# Patient Record
Sex: MALE | Race: WHITE | NOT HISPANIC OR LATINO | Employment: OTHER | URBAN - NONMETROPOLITAN AREA
[De-identification: names, ages, dates, MRNs, and addresses within clinical notes are randomized per-mention and may not be internally consistent; named-entity substitution may affect disease eponyms.]

---

## 2017-10-03 ENCOUNTER — OFFICE VISIT (OUTPATIENT)
Dept: INTERNAL MEDICINE | Facility: CLINIC | Age: 57
End: 2017-10-03

## 2017-10-03 VITALS
WEIGHT: 173 LBS | HEIGHT: 72 IN | SYSTOLIC BLOOD PRESSURE: 122 MMHG | OXYGEN SATURATION: 97 % | BODY MASS INDEX: 23.43 KG/M2 | TEMPERATURE: 98.1 F | DIASTOLIC BLOOD PRESSURE: 74 MMHG | RESPIRATION RATE: 14 BRPM | HEART RATE: 88 BPM

## 2017-10-03 DIAGNOSIS — L21.9 SEBORRHEIC DERMATITIS: ICD-10-CM

## 2017-10-03 DIAGNOSIS — H61.23 BILATERAL IMPACTED CERUMEN: ICD-10-CM

## 2017-10-03 DIAGNOSIS — Z00.00 HEALTHCARE MAINTENANCE: ICD-10-CM

## 2017-10-03 DIAGNOSIS — Z12.11 COLON CANCER SCREENING: ICD-10-CM

## 2017-10-03 DIAGNOSIS — R21 RASH, SKIN: ICD-10-CM

## 2017-10-03 DIAGNOSIS — M54.50 LOW BACK PAIN WITHOUT SCIATICA, UNSPECIFIED BACK PAIN LATERALITY, UNSPECIFIED CHRONICITY: Primary | ICD-10-CM

## 2017-10-03 PROCEDURE — 90686 IIV4 VACC NO PRSV 0.5 ML IM: CPT | Performed by: INTERNAL MEDICINE

## 2017-10-03 PROCEDURE — 82270 OCCULT BLOOD FECES: CPT | Performed by: INTERNAL MEDICINE

## 2017-10-03 PROCEDURE — 69209 REMOVE IMPACTED EAR WAX UNI: CPT | Performed by: INTERNAL MEDICINE

## 2017-10-03 PROCEDURE — 90471 IMMUNIZATION ADMIN: CPT | Performed by: INTERNAL MEDICINE

## 2017-10-03 PROCEDURE — 99396 PREV VISIT EST AGE 40-64: CPT | Performed by: INTERNAL MEDICINE

## 2017-10-03 NOTE — PROGRESS NOTES
Subjective   El Greenwood is a 57 y.o. male and is here for a comprehensive physical exam.     Do you take any herbs or supplements that were not prescribed by a doctor? no  Are you taking calcium supplements? no  Are you taking aspirin daily? no      The following portions of the patient's history were reviewed and updated as appropriate: allergies, current medications, past family history, past medical history, past social history, past surgical history and problem list.      Review of Systems   Constitutional: Negative.    HENT: Negative.    Eyes: Negative.    Respiratory: Negative.    Cardiovascular: Negative.    Gastrointestinal: Negative.    Endocrine: Negative.    Genitourinary: Negative.    Musculoskeletal: Negative.    Skin: Positive for rash.   Allergic/Immunologic: Negative.    Neurological: Negative.    Hematological: Negative.    Psychiatric/Behavioral: Negative.    All other systems reviewed and are negative.        Physical Exam   Constitutional: He is oriented to person, place, and time. He appears well-developed and well-nourished.   HENT:   Head: Normocephalic and atraumatic.   Right Ear: External ear normal.   Left Ear: External ear normal.   Nose: Nose normal.   Mouth/Throat: Oropharynx is clear and moist.   Cerumen impaction bilaterally   Eyes: Conjunctivae and EOM are normal. Pupils are equal, round, and reactive to light.   Neck: Normal range of motion. Neck supple. No thyromegaly present.   Cardiovascular: Normal rate, regular rhythm, normal heart sounds and intact distal pulses.    Pulmonary/Chest: Effort normal and breath sounds normal.   Abdominal: Soft. Bowel sounds are normal.   Genitourinary: Rectum normal, prostate normal and penis normal.   Musculoskeletal: Normal range of motion.   Neurological: He is alert and oriented to person, place, and time. He has normal reflexes.   Skin: Skin is warm and dry. Rash noted.   Psychiatric: He has a normal mood and affect. His behavior is  normal. Judgment and thought content normal.   Nursing note and vitals reviewed.      All  tests have been reviewed.    Assessment/Plan          1. Patient Counseling:  --Nutrition: Stressed importance of moderation in sodium/caffeine intake, saturated fat and cholesterol, caloric balance, sufficient intake of fresh fruits, vegetables, fiber, calcium and iron.  --Exercise: Stressed the importance of regular exercise.   --Injury prevention: Discussed safety belts, safety helmets, smoke detector, smoking near bedding or upholstery.   --Dental health: Discussed importance of regular tooth brushing, flossing, and dental visits.  --Immunizations reviewed.  --Discussed benefits of screening colonoscopy.  --After hours service discussed with patient    2. Discussed the patient's BMI with him.            facial SD continue ointment  LBP XR mild DJD  hematuria, history of work up for decades.obtain records, patient to get.   Floater follow up with eye doc  vitD low , continue vitD3 2000u daily  solar lentigo, watch   tdap 12/2011  vasectomy history  colon 2009 normal. patient wants to repeat in 2019  Cerumen impaction failed to manual.  Irrigation successful  Do lab now  Physical in a year     Ear Cerumen Removal Instrumentation  Date/Time: 10/3/2017 4:59 PM  Performed by: ROCK RIVERO  Authorized by: ROCK RIVERO   Consent: Verbal consent obtained.  Risks and benefits: risks, benefits and alternatives were discussed  Consent given by: patient  Patient understanding: patient states understanding of the procedure being performed  Patient consent: the patient's understanding of the procedure matches consent given  Procedure consent: procedure consent matches procedure scheduled  Patient identity confirmed: verbally with patient    Anesthesia:  Local Anesthetic: none  Location details: right ear and left ear  Procedure type: irrigation    Sedation:  Patient sedated: no  Patient tolerance: Patient tolerated the procedure well  with no immediate complications

## 2017-10-05 DIAGNOSIS — E87.5 POTASSIUM EXCESS: Primary | ICD-10-CM

## 2017-10-06 LAB
25(OH)D3+25(OH)D2 SERPL-MCNC: 30.1 NG/ML
ALBUMIN SERPL-MCNC: 4.5 G/DL (ref 3.5–5)
ALBUMIN/GLOB SERPL: 1.6 G/DL (ref 1–2)
ALP SERPL-CCNC: 79 U/L (ref 38–126)
ALT SERPL-CCNC: 32 U/L (ref 13–69)
APPEARANCE UR: CLEAR
AST SERPL-CCNC: 25 U/L (ref 15–46)
BACTERIA #/AREA URNS HPF: NORMAL /HPF
BASOPHILS # BLD AUTO: 0.05 10*3/MM3 (ref 0–0.2)
BASOPHILS NFR BLD AUTO: 0.9 % (ref 0–2.5)
BILIRUB SERPL-MCNC: 0.7 MG/DL (ref 0.2–1.3)
BILIRUB UR QL STRIP: NEGATIVE
BUN SERPL-MCNC: 19 MG/DL (ref 7–20)
BUN/CREAT SERPL: 17.3 (ref 6.3–21.9)
CALCIUM SERPL-MCNC: 10 MG/DL (ref 8.4–10.2)
CHLORIDE SERPL-SCNC: 102 MMOL/L (ref 98–107)
CHOLEST SERPL-MCNC: 189 MG/DL (ref 0–199)
CO2 SERPL-SCNC: 31 MMOL/L (ref 26–30)
COLOR UR: YELLOW
CREAT SERPL-MCNC: 1.1 MG/DL (ref 0.6–1.3)
EOSINOPHIL # BLD AUTO: 0.22 10*3/MM3 (ref 0–0.7)
EOSINOPHIL NFR BLD AUTO: 3.9 % (ref 0–7)
EPI CELLS #/AREA URNS HPF: NORMAL /HPF
ERYTHROCYTE [DISTWIDTH] IN BLOOD BY AUTOMATED COUNT: 12.6 % (ref 11.5–14.5)
GLOBULIN SER CALC-MCNC: 2.8 GM/DL
GLUCOSE SERPL-MCNC: 98 MG/DL (ref 74–98)
GLUCOSE UR QL: NEGATIVE
HCT VFR BLD AUTO: 46.7 % (ref 42–52)
HCV AB S/CO SERPL IA: 0.1 S/CO RATIO (ref 0–0.9)
HDLC SERPL-MCNC: 44 MG/DL (ref 40–60)
HGB BLD-MCNC: 15.1 G/DL (ref 14–18)
HGB UR QL STRIP: ABNORMAL
IMM GRANULOCYTES # BLD: 0.03 10*3/MM3 (ref 0–0.06)
IMM GRANULOCYTES NFR BLD: 0.5 % (ref 0–0.6)
KETONES UR QL STRIP: NEGATIVE
LDLC SERPL CALC-MCNC: 123 MG/DL (ref 0–99)
LEUKOCYTE ESTERASE UR QL STRIP: NEGATIVE
LYMPHOCYTES # BLD AUTO: 1.82 10*3/MM3 (ref 0.6–3.4)
LYMPHOCYTES NFR BLD AUTO: 32.5 % (ref 10–50)
MCH RBC QN AUTO: 29.5 PG (ref 27–31)
MCHC RBC AUTO-ENTMCNC: 32.3 G/DL (ref 30–37)
MCV RBC AUTO: 91.2 FL (ref 80–94)
MONOCYTES # BLD AUTO: 0.57 10*3/MM3 (ref 0–0.9)
MONOCYTES NFR BLD AUTO: 10.2 % (ref 0–12)
NEUTROPHILS # BLD AUTO: 2.91 10*3/MM3 (ref 2–6.9)
NEUTROPHILS NFR BLD AUTO: 52 % (ref 37–80)
NITRITE UR QL STRIP: NEGATIVE
NRBC BLD AUTO-RTO: 0 /100 WBC (ref 0–0)
PH UR STRIP: 6.5 [PH] (ref 5–8)
PLATELET # BLD AUTO: 199 10*3/MM3 (ref 130–400)
POTASSIUM SERPL-SCNC: 5.3 MMOL/L (ref 3.5–5.1)
PROT SERPL-MCNC: 7.3 G/DL (ref 6.3–8.2)
PROT UR QL STRIP: NEGATIVE
PSA SERPL-MCNC: 0.58 NG/ML (ref 0.06–4)
RBC # BLD AUTO: 5.12 10*6/MM3 (ref 4.7–6.1)
RBC #/AREA URNS HPF: NORMAL /HPF
SODIUM SERPL-SCNC: 144 MMOL/L (ref 137–145)
SP GR UR: 1.02 (ref 1–1.03)
TRIGL SERPL-MCNC: 108 MG/DL
TSH SERPL DL<=0.005 MIU/L-ACNC: 1.6 MIU/ML (ref 0.47–4.68)
UROBILINOGEN UR STRIP-MCNC: ABNORMAL MG/DL
VLDLC SERPL CALC-MCNC: 21.6 MG/DL
WBC # BLD AUTO: 5.6 10*3/MM3 (ref 4.8–10.8)
WBC #/AREA URNS HPF: NORMAL /HPF

## 2017-10-10 ENCOUNTER — TELEPHONE (OUTPATIENT)
Dept: INTERNAL MEDICINE | Facility: CLINIC | Age: 57
End: 2017-10-10

## 2017-10-10 ENCOUNTER — RESULTS ENCOUNTER (OUTPATIENT)
Dept: INTERNAL MEDICINE | Facility: CLINIC | Age: 57
End: 2017-10-10

## 2017-10-10 DIAGNOSIS — E87.5 POTASSIUM EXCESS: ICD-10-CM

## 2017-10-10 NOTE — TELEPHONE ENCOUNTER
Talk with patient patient will only pay $82 for the blood tests.  Patient is okay without change in diagnosis.

## 2017-10-11 LAB — HEMOCCULT STL QL IA: NEGATIVE

## 2018-11-01 ENCOUNTER — OFFICE VISIT (OUTPATIENT)
Dept: INTERNAL MEDICINE | Facility: CLINIC | Age: 58
End: 2018-11-01

## 2018-11-01 VITALS
DIASTOLIC BLOOD PRESSURE: 78 MMHG | SYSTOLIC BLOOD PRESSURE: 118 MMHG | WEIGHT: 172 LBS | OXYGEN SATURATION: 99 % | HEART RATE: 74 BPM | HEIGHT: 72 IN | BODY MASS INDEX: 23.3 KG/M2

## 2018-11-01 DIAGNOSIS — L21.9 SEBORRHEIC DERMATITIS: ICD-10-CM

## 2018-11-01 DIAGNOSIS — Z00.00 HEALTHCARE MAINTENANCE: ICD-10-CM

## 2018-11-01 DIAGNOSIS — M54.50 LOW BACK PAIN WITHOUT SCIATICA, UNSPECIFIED BACK PAIN LATERALITY, UNSPECIFIED CHRONICITY: Primary | ICD-10-CM

## 2018-11-01 PROBLEM — H61.23 BILATERAL IMPACTED CERUMEN: Status: RESOLVED | Noted: 2017-10-03 | Resolved: 2018-11-01

## 2018-11-01 PROCEDURE — 99396 PREV VISIT EST AGE 40-64: CPT | Performed by: INTERNAL MEDICINE

## 2018-11-01 PROCEDURE — 90674 CCIIV4 VAC NO PRSV 0.5 ML IM: CPT | Performed by: INTERNAL MEDICINE

## 2018-11-01 PROCEDURE — 90471 IMMUNIZATION ADMIN: CPT | Performed by: INTERNAL MEDICINE

## 2018-11-01 NOTE — PROGRESS NOTES
Subjective   El Greenwood is a 58 y.o. male and is here for a comprehensive physical exam.     Do you take any herbs or supplements that were not prescribed by a doctor? no  Are you taking calcium supplements? no  Are you taking aspirin daily? no      The following portions of the patient's history were reviewed and updated as appropriate: allergies, current medications, past family history, past medical history, past social history, past surgical history and problem list.      Review of Systems   Constitutional: Negative.    HENT: Negative.    Eyes: Negative.    Respiratory: Negative.    Cardiovascular: Negative.    Gastrointestinal: Negative.    Endocrine: Negative.    Genitourinary: Negative.    Musculoskeletal: Negative.    Skin: Negative.    Allergic/Immunologic: Negative.    Neurological: Negative.    Hematological: Negative.    Psychiatric/Behavioral: Negative.    All other systems reviewed and are negative.        Physical Exam   Constitutional: He is oriented to person, place, and time. He appears well-developed and well-nourished.   HENT:   Head: Normocephalic and atraumatic.   Right Ear: External ear normal.   Left Ear: External ear normal.   Nose: Nose normal.   Mouth/Throat: Oropharynx is clear and moist.   Eyes: Pupils are equal, round, and reactive to light. Conjunctivae and EOM are normal.   Neck: Normal range of motion. Neck supple. No thyromegaly present.   Cardiovascular: Normal rate, regular rhythm, normal heart sounds and intact distal pulses.    Pulmonary/Chest: Effort normal and breath sounds normal.   Abdominal: Soft. Bowel sounds are normal.   Genitourinary: Rectum normal, prostate normal and penis normal.   Musculoskeletal: Normal range of motion.   Neurological: He is alert and oriented to person, place, and time. He has normal reflexes.   Skin: Skin is warm and dry.   Psychiatric: He has a normal mood and affect. His behavior is normal. Judgment and thought content normal.   Nursing  note and vitals reviewed.      All  tests have been reviewed.    Assessment/Plan          1. Patient Counseling:  --Nutrition: Stressed importance of moderation in sodium/caffeine intake, saturated fat and cholesterol, caloric balance, sufficient intake of fresh fruits, vegetables, fiber, calcium and iron.  --Exercise: Stressed the importance of regular exercise.   --Injury prevention: Discussed safety belts, safety helmets, smoke detector, smoking near bedding or upholstery.   --Dental health: Discussed importance of regular tooth brushing, flossing, and dental visits.  --Immunizations reviewed.  --Discussed benefits of screening colonoscopy.  --After hours service discussed with patient    2. Discussed the patient's BMI with him.             facial SD continue ointment  LBP XR mild DJD  hematuria, history of work up for decades.obtain records, patient to get.   Floater follow up with eye doc   vitD low , continue vitD3 2000u daily  solar lentigo, watch   tdap 12/2011, flushot today, hep A to think  vasectomy history  colon 2009 normal. patient wants to repeat in 2019    Do lab now  Physical in a year

## 2018-11-05 ENCOUNTER — RESULTS ENCOUNTER (OUTPATIENT)
Dept: INTERNAL MEDICINE | Facility: CLINIC | Age: 58
End: 2018-11-05

## 2018-11-05 DIAGNOSIS — R31.9 HEMATURIA, UNSPECIFIED TYPE: Primary | ICD-10-CM

## 2018-11-05 DIAGNOSIS — R31.9 HEMATURIA, UNSPECIFIED TYPE: ICD-10-CM

## 2018-11-05 LAB
25(OH)D3+25(OH)D2 SERPL-MCNC: 33 NG/ML
ALBUMIN SERPL-MCNC: 4.4 G/DL (ref 3.5–5)
ALBUMIN/GLOB SERPL: 1.6 G/DL (ref 1–2)
ALP SERPL-CCNC: 78 U/L (ref 38–126)
ALT SERPL-CCNC: 32 U/L (ref 13–69)
APPEARANCE UR: CLEAR
AST SERPL-CCNC: 31 U/L (ref 15–46)
BACTERIA #/AREA URNS HPF: ABNORMAL /HPF
BASOPHILS # BLD AUTO: 0.04 10*3/MM3 (ref 0–0.2)
BASOPHILS NFR BLD AUTO: 0.7 % (ref 0–2.5)
BILIRUB SERPL-MCNC: 0.7 MG/DL (ref 0.2–1.3)
BILIRUB UR QL STRIP: NEGATIVE
BUN SERPL-MCNC: 19 MG/DL (ref 7–20)
BUN/CREAT SERPL: 17.3 (ref 6.3–21.9)
CALCIUM SERPL-MCNC: 9.7 MG/DL (ref 8.4–10.2)
CASTS URNS MICRO: ABNORMAL
CHLORIDE SERPL-SCNC: 102 MMOL/L (ref 98–107)
CHOLEST SERPL-MCNC: 195 MG/DL (ref 0–199)
CO2 SERPL-SCNC: 30 MMOL/L (ref 26–30)
COLOR UR: YELLOW
CREAT SERPL-MCNC: 1.1 MG/DL (ref 0.6–1.3)
EOSINOPHIL # BLD AUTO: 0.17 10*3/MM3 (ref 0–0.7)
EOSINOPHIL NFR BLD AUTO: 2.8 % (ref 0–7)
EPI CELLS #/AREA URNS HPF: ABNORMAL /HPF
ERYTHROCYTE [DISTWIDTH] IN BLOOD BY AUTOMATED COUNT: 13 % (ref 11.5–14.5)
GLOBULIN SER CALC-MCNC: 2.8 GM/DL
GLUCOSE SERPL-MCNC: 99 MG/DL (ref 74–98)
GLUCOSE UR QL: NEGATIVE
HCT VFR BLD AUTO: 46.4 % (ref 42–52)
HDLC SERPL-MCNC: 54 MG/DL (ref 40–60)
HGB BLD-MCNC: 15 G/DL (ref 14–18)
HGB UR QL STRIP: ABNORMAL
IMM GRANULOCYTES # BLD: 0.01 10*3/MM3 (ref 0–0.06)
IMM GRANULOCYTES NFR BLD: 0.2 % (ref 0–0.6)
KETONES UR QL STRIP: NEGATIVE
LDLC SERPL CALC-MCNC: 123 MG/DL (ref 0–99)
LEUKOCYTE ESTERASE UR QL STRIP: NEGATIVE
LYMPHOCYTES # BLD AUTO: 1.79 10*3/MM3 (ref 0.6–3.4)
LYMPHOCYTES NFR BLD AUTO: 29.7 % (ref 10–50)
MCH RBC QN AUTO: 29.8 PG (ref 27–31)
MCHC RBC AUTO-ENTMCNC: 32.3 G/DL (ref 30–37)
MCV RBC AUTO: 92.2 FL (ref 80–94)
MONOCYTES # BLD AUTO: 0.71 10*3/MM3 (ref 0–0.9)
MONOCYTES NFR BLD AUTO: 11.8 % (ref 0–12)
MUCOUS THREADS URNS QL MICRO: ABNORMAL /HPF
NEUTROPHILS # BLD AUTO: 3.31 10*3/MM3 (ref 2–6.9)
NEUTROPHILS NFR BLD AUTO: 54.8 % (ref 37–80)
NITRITE UR QL STRIP: NEGATIVE
NRBC BLD AUTO-RTO: 0 /100 WBC (ref 0–0)
PH UR STRIP: 7 [PH] (ref 5–8)
PLATELET # BLD AUTO: 209 10*3/MM3 (ref 130–400)
POTASSIUM SERPL-SCNC: 4.5 MMOL/L (ref 3.5–5.1)
PROT SERPL-MCNC: 7.2 G/DL (ref 6.3–8.2)
PROT UR QL STRIP: NEGATIVE
PSA SERPL-MCNC: 0.51 NG/ML (ref 0.06–4)
RBC # BLD AUTO: 5.03 10*6/MM3 (ref 4.7–6.1)
RBC #/AREA URNS HPF: ABNORMAL /HPF
SODIUM SERPL-SCNC: 138 MMOL/L (ref 137–145)
SP GR UR: 1.01 (ref 1–1.03)
TRIGL SERPL-MCNC: 92 MG/DL
TSH SERPL DL<=0.005 MIU/L-ACNC: 1.33 MIU/ML (ref 0.47–4.68)
UROBILINOGEN UR STRIP-MCNC: ABNORMAL MG/DL
VLDLC SERPL CALC-MCNC: 18.4 MG/DL
WBC # BLD AUTO: 6.03 10*3/MM3 (ref 4.8–10.8)
WBC #/AREA URNS HPF: ABNORMAL /HPF

## 2019-12-04 ENCOUNTER — OFFICE VISIT (OUTPATIENT)
Dept: INTERNAL MEDICINE | Facility: CLINIC | Age: 59
End: 2019-12-04

## 2019-12-04 VITALS
TEMPERATURE: 97.4 F | HEIGHT: 72 IN | HEART RATE: 82 BPM | DIASTOLIC BLOOD PRESSURE: 72 MMHG | BODY MASS INDEX: 24.62 KG/M2 | OXYGEN SATURATION: 96 % | WEIGHT: 181.8 LBS | RESPIRATION RATE: 18 BRPM | SYSTOLIC BLOOD PRESSURE: 126 MMHG

## 2019-12-04 DIAGNOSIS — Z12.11 COLON CANCER SCREENING: ICD-10-CM

## 2019-12-04 DIAGNOSIS — Z00.00 HEALTHCARE MAINTENANCE: ICD-10-CM

## 2019-12-04 DIAGNOSIS — M54.50 LOW BACK PAIN WITHOUT SCIATICA, UNSPECIFIED BACK PAIN LATERALITY, UNSPECIFIED CHRONICITY: Primary | ICD-10-CM

## 2019-12-04 PROCEDURE — 90472 IMMUNIZATION ADMIN EACH ADD: CPT | Performed by: INTERNAL MEDICINE

## 2019-12-04 PROCEDURE — 90471 IMMUNIZATION ADMIN: CPT | Performed by: INTERNAL MEDICINE

## 2019-12-04 PROCEDURE — 90632 HEPA VACCINE ADULT IM: CPT | Performed by: INTERNAL MEDICINE

## 2019-12-04 PROCEDURE — 90674 CCIIV4 VAC NO PRSV 0.5 ML IM: CPT | Performed by: INTERNAL MEDICINE

## 2019-12-04 PROCEDURE — 99396 PREV VISIT EST AGE 40-64: CPT | Performed by: INTERNAL MEDICINE

## 2019-12-04 NOTE — PROGRESS NOTES
Subjective   El Greenwood is a 59 y.o. male and is here for a comprehensive physical exam.     Do you take any herbs or supplements that were not prescribed by a doctor? no  Are you taking calcium supplements? no  Are you taking aspirin daily? no      The following portions of the patient's history were reviewed and updated as appropriate: allergies, current medications, past family history, past medical history, past social history, past surgical history and problem list.      Review of Systems   Constitutional: Negative.    HENT: Negative.    Eyes: Negative.    Respiratory: Negative.    Cardiovascular: Negative.    Gastrointestinal: Negative.    Endocrine: Negative.    Genitourinary: Negative.    Musculoskeletal: Negative.    Skin: Negative.    Allergic/Immunologic: Negative.    Neurological: Negative.    Hematological: Negative.    Psychiatric/Behavioral: Negative.    All other systems reviewed and are negative.        Physical Exam   Constitutional: He is oriented to person, place, and time. He appears well-developed and well-nourished.   HENT:   Head: Normocephalic and atraumatic.   Right Ear: External ear normal.   Left Ear: External ear normal.   Nose: Nose normal.   Mouth/Throat: Oropharynx is clear and moist.   Eyes: Conjunctivae and EOM are normal. Pupils are equal, round, and reactive to light.   Neck: Normal range of motion. Neck supple. No thyromegaly present.   Cardiovascular: Normal rate, regular rhythm, normal heart sounds and intact distal pulses.   Pulmonary/Chest: Effort normal and breath sounds normal.   Abdominal: Soft. Bowel sounds are normal.   Genitourinary: Rectum normal, prostate normal and penis normal.   Musculoskeletal: Normal range of motion.   Neurological: He is alert and oriented to person, place, and time. He has normal reflexes.   Skin: Skin is warm and dry.   Psychiatric: He has a normal mood and affect. His behavior is normal. Judgment and thought content normal.   Nursing  note and vitals reviewed.      All  tests have been reviewed.    Assessment/Plan          1. Patient Counseling:  --Nutrition: Stressed importance of moderation in sodium/caffeine intake, saturated fat and cholesterol, caloric balance, sufficient intake of fresh fruits, vegetables, fiber, calcium and iron.  --Exercise: Stressed the importance of regular exercise.   --Injury prevention: Discussed safety belts, safety helmets, smoke detector, smoking near bedding or upholstery.   --Dental health: Discussed importance of regular tooth brushing, flossing, and dental visits.  --Immunizations reviewed.  --Discussed benefits of screening colonoscopy.  --After hours service discussed with patient    2. Discussed the patient's BMI with him.             facial SD continue ointment  LBP XR mild DJD  hematuria, history of work up for decades.obtain records, patient to get.   Floater follow up with eye doc   vitD low , continue vitD3 2000u daily  solar lentigo, watch   tdap 12/2011, flushot done, hep A to think  vasectomy history  colon 2009 normal. patient wants to repeat in 2019     Do lab now

## 2019-12-06 LAB
25(OH)D3+25(OH)D2 SERPL-MCNC: 24.9 NG/ML (ref 30–100)
ALBUMIN SERPL-MCNC: 4.3 G/DL (ref 3.5–5.2)
ALBUMIN/GLOB SERPL: 1.7 G/DL
ALP SERPL-CCNC: 76 U/L (ref 39–117)
ALT SERPL-CCNC: 19 U/L (ref 1–41)
AST SERPL-CCNC: 22 U/L (ref 1–40)
BASOPHILS # BLD AUTO: 0.04 10*3/MM3 (ref 0–0.2)
BASOPHILS NFR BLD AUTO: 0.7 % (ref 0–1.5)
BILIRUB SERPL-MCNC: 0.5 MG/DL (ref 0.2–1.2)
BUN SERPL-MCNC: 20 MG/DL (ref 8–23)
BUN/CREAT SERPL: 18.9 (ref 7–25)
CALCIUM SERPL-MCNC: 9.3 MG/DL (ref 8.6–10.5)
CHLORIDE SERPL-SCNC: 104 MMOL/L (ref 98–107)
CHOLEST SERPL-MCNC: 185 MG/DL (ref 0–200)
CO2 SERPL-SCNC: 29.4 MMOL/L (ref 22–29)
CREAT SERPL-MCNC: 1.06 MG/DL (ref 0.76–1.27)
EOSINOPHIL # BLD AUTO: 0.18 10*3/MM3 (ref 0–0.4)
EOSINOPHIL NFR BLD AUTO: 3 % (ref 0.3–6.2)
ERYTHROCYTE [DISTWIDTH] IN BLOOD BY AUTOMATED COUNT: 12.6 % (ref 12.3–15.4)
GLOBULIN SER CALC-MCNC: 2.5 GM/DL
GLUCOSE SERPL-MCNC: 95 MG/DL (ref 65–99)
HCT VFR BLD AUTO: 42.9 % (ref 37.5–51)
HDLC SERPL-MCNC: 46 MG/DL (ref 40–60)
HGB BLD-MCNC: 14.3 G/DL (ref 13–17.7)
IMM GRANULOCYTES # BLD AUTO: 0.02 10*3/MM3 (ref 0–0.05)
IMM GRANULOCYTES NFR BLD AUTO: 0.3 % (ref 0–0.5)
LDLC SERPL CALC-MCNC: 119 MG/DL (ref 0–100)
LYMPHOCYTES # BLD AUTO: 2.05 10*3/MM3 (ref 0.7–3.1)
LYMPHOCYTES NFR BLD AUTO: 34.6 % (ref 19.6–45.3)
MCH RBC QN AUTO: 30.2 PG (ref 26.6–33)
MCHC RBC AUTO-ENTMCNC: 33.3 G/DL (ref 31.5–35.7)
MCV RBC AUTO: 90.5 FL (ref 79–97)
MONOCYTES # BLD AUTO: 0.65 10*3/MM3 (ref 0.1–0.9)
MONOCYTES NFR BLD AUTO: 11 % (ref 5–12)
NEUTROPHILS # BLD AUTO: 2.99 10*3/MM3 (ref 1.7–7)
NEUTROPHILS NFR BLD AUTO: 50.4 % (ref 42.7–76)
NRBC BLD AUTO-RTO: 0 /100 WBC (ref 0–0.2)
PLATELET # BLD AUTO: 203 10*3/MM3 (ref 140–450)
POTASSIUM SERPL-SCNC: 4.4 MMOL/L (ref 3.5–5.2)
PROT SERPL-MCNC: 6.8 G/DL (ref 6–8.5)
PSA SERPL-MCNC: 0.83 NG/ML (ref 0–4)
RBC # BLD AUTO: 4.74 10*6/MM3 (ref 4.14–5.8)
SODIUM SERPL-SCNC: 143 MMOL/L (ref 136–145)
TRIGL SERPL-MCNC: 101 MG/DL (ref 0–150)
TSH SERPL DL<=0.005 MIU/L-ACNC: 1.78 UIU/ML (ref 0.27–4.2)
VLDLC SERPL CALC-MCNC: 20.2 MG/DL
WBC # BLD AUTO: 5.93 10*3/MM3 (ref 3.4–10.8)

## 2019-12-11 ENCOUNTER — PREP FOR SURGERY (OUTPATIENT)
Dept: OTHER | Facility: HOSPITAL | Age: 59
End: 2019-12-11

## 2019-12-11 DIAGNOSIS — Z12.11 COLON CANCER SCREENING: Primary | ICD-10-CM

## 2019-12-11 RX ORDER — SODIUM CHLORIDE 9 MG/ML
70 INJECTION, SOLUTION INTRAVENOUS CONTINUOUS PRN
Status: CANCELLED | OUTPATIENT
Start: 2019-12-11

## 2019-12-30 PROBLEM — Z12.11 COLON CANCER SCREENING: Status: ACTIVE | Noted: 2019-12-30

## 2020-02-10 ENCOUNTER — TELEPHONE (OUTPATIENT)
Dept: GASTROENTEROLOGY | Facility: CLINIC | Age: 60
End: 2020-02-10

## 2020-02-10 NOTE — TELEPHONE ENCOUNTER
----- Message from Teri Ovalles sent at 2/10/2020  1:13 PM EST -----  Contact: [phone]  The patient left a  requesting that someone call him.  He states he is scheduled for a colonoscopy and has questions about what he can and cannot eat.

## 2020-02-12 ENCOUNTER — ANESTHESIA EVENT (OUTPATIENT)
Dept: GASTROENTEROLOGY | Facility: HOSPITAL | Age: 60
End: 2020-02-12

## 2020-02-12 ENCOUNTER — ANESTHESIA (OUTPATIENT)
Dept: GASTROENTEROLOGY | Facility: HOSPITAL | Age: 60
End: 2020-02-12

## 2020-02-12 ENCOUNTER — HOSPITAL ENCOUNTER (OUTPATIENT)
Facility: HOSPITAL | Age: 60
Setting detail: HOSPITAL OUTPATIENT SURGERY
Discharge: HOME | End: 2020-02-12
Attending: INTERNAL MEDICINE | Admitting: INTERNAL MEDICINE

## 2020-02-12 VITALS
HEIGHT: 72 IN | SYSTOLIC BLOOD PRESSURE: 115 MMHG | OXYGEN SATURATION: 97 % | WEIGHT: 175.25 LBS | RESPIRATION RATE: 16 BRPM | BODY MASS INDEX: 23.74 KG/M2 | DIASTOLIC BLOOD PRESSURE: 73 MMHG | HEART RATE: 68 BPM | TEMPERATURE: 97.4 F

## 2020-02-12 DIAGNOSIS — Z12.11 COLON CANCER SCREENING: ICD-10-CM

## 2020-02-12 PROCEDURE — 45380 COLONOSCOPY AND BIOPSY: CPT | Performed by: INTERNAL MEDICINE

## 2020-02-12 PROCEDURE — S0260 H&P FOR SURGERY: HCPCS | Performed by: INTERNAL MEDICINE

## 2020-02-12 PROCEDURE — 25010000002 PROPOFOL 1000 MG/100ML EMULSION: Performed by: NURSE ANESTHETIST, CERTIFIED REGISTERED

## 2020-02-12 PROCEDURE — 25010000002 FENTANYL CITRATE (PF) 100 MCG/2ML SOLUTION: Performed by: NURSE ANESTHETIST, CERTIFIED REGISTERED

## 2020-02-12 PROCEDURE — 45385 COLONOSCOPY W/LESION REMOVAL: CPT | Performed by: INTERNAL MEDICINE

## 2020-02-12 RX ORDER — SODIUM CHLORIDE 9 MG/ML
70 INJECTION, SOLUTION INTRAVENOUS CONTINUOUS PRN
Status: DISCONTINUED | OUTPATIENT
Start: 2020-02-12 | End: 2020-02-12 | Stop reason: HOSPADM

## 2020-02-12 RX ORDER — SIMETHICONE 20 MG/.3ML
EMULSION ORAL AS NEEDED
Status: DISCONTINUED | OUTPATIENT
Start: 2020-02-12 | End: 2020-02-12 | Stop reason: HOSPADM

## 2020-02-12 RX ORDER — LIDOCAINE HYDROCHLORIDE 20 MG/ML
INJECTION, SOLUTION INTRAVENOUS AS NEEDED
Status: DISCONTINUED | OUTPATIENT
Start: 2020-02-12 | End: 2020-02-12 | Stop reason: SURG

## 2020-02-12 RX ORDER — FENTANYL CITRATE 50 UG/ML
INJECTION, SOLUTION INTRAMUSCULAR; INTRAVENOUS AS NEEDED
Status: DISCONTINUED | OUTPATIENT
Start: 2020-02-12 | End: 2020-02-12 | Stop reason: SURG

## 2020-02-12 RX ORDER — PROPOFOL 10 MG/ML
INJECTION, EMULSION INTRAVENOUS AS NEEDED
Status: DISCONTINUED | OUTPATIENT
Start: 2020-02-12 | End: 2020-02-12 | Stop reason: SURG

## 2020-02-12 RX ORDER — LIDOCAINE 50 MG/G
OINTMENT TOPICAL AS NEEDED
Status: DISCONTINUED | OUTPATIENT
Start: 2020-02-12 | End: 2020-02-12 | Stop reason: HOSPADM

## 2020-02-12 RX ORDER — MAGNESIUM HYDROXIDE 1200 MG/15ML
LIQUID ORAL AS NEEDED
Status: DISCONTINUED | OUTPATIENT
Start: 2020-02-12 | End: 2020-02-12 | Stop reason: HOSPADM

## 2020-02-12 RX ADMIN — FENTANYL CITRATE 100 MCG: 50 INJECTION, SOLUTION INTRAMUSCULAR; INTRAVENOUS at 08:57

## 2020-02-12 RX ADMIN — PROPOFOL 50 MG: 10 INJECTION, EMULSION INTRAVENOUS at 08:57

## 2020-02-12 RX ADMIN — PROPOFOL 50 MG: 10 INJECTION, EMULSION INTRAVENOUS at 09:31

## 2020-02-12 RX ADMIN — LIDOCAINE HYDROCHLORIDE 100 MG: 20 INJECTION, SOLUTION INTRAVENOUS at 08:57

## 2020-02-12 RX ADMIN — PROPOFOL 50 MG: 10 INJECTION, EMULSION INTRAVENOUS at 09:23

## 2020-02-12 RX ADMIN — PROPOFOL 50 MG: 10 INJECTION, EMULSION INTRAVENOUS at 09:06

## 2020-02-12 RX ADMIN — PROPOFOL 50 MG: 10 INJECTION, EMULSION INTRAVENOUS at 09:16

## 2020-02-12 RX ADMIN — SODIUM CHLORIDE 70 ML/HR: 9 INJECTION, SOLUTION INTRAVENOUS at 08:22

## 2020-02-12 NOTE — OP NOTE
PROCEDURE:  Colonoscopy to the terminal ileum with 1 cold snare and 4 cold biopsy polypectomies.    DATE OF PROCEDURE:  February 13, 2020    REFERRING PROVIDER:  Jose Ibrahim MD     INSTRUMENT USED:  Olympus PCF H 190 DL videocolonoscope.     INDICATIONS OF THE PROCEDURE:   This is a 60-year-old white male for colon cancer screening.  (Open Colonoscopy).    PREVIOUS COLONOSCOPY: 2009.    BIOPSIES: Cecum: 1 cold snare polypectomy.  Ascending colon: 1 cold biopsy polypectomy.  Descending colon: 1 cold biopsy polypectomy.  Rectum: 2 cold biopsy polypectomies.      PHOTOGRAPHS:  Photographs were included in the medical records.     MEDICATIONS:  MAC.       CONSENT/PREPROCEDURE EVALUATION:  Risks, benefits, alternatives and options of the procedure including risks of sedation/anesthesia were discussed with the patient and informed consent was obtained prior to the procedure.  History and physical examination were performed and nothing precluded the test.      REPORT:  The patient was placed in left lateral decubitus position and a digital examination was performed.  Once under the influence of IV sedation, the instrument was inserted into the rectum and advanced under direct vision to cecum which was identified by the ileocecal valve, triradiate folds and appendiceal orifice. The scope was then maneuvered into the terminal ileum.        FINDINGS:      Digital rectal examination:  Good anal tone.  No perianal pathology.  No mass.        Terminal ileum:  7-8 cm.  Normal.     Cecum and ascending colon: A 5 mm sessile polyp in the cecum was removed with cold snare.  3 mm sessile polyp in the ascending colon was removed with cold biopsy forceps.     Hepatic flexure, transverse colon, splenic flexure:  Normal.         Descending colon, sigmoid colon and rectum: Diverticulosis.  1,  3 mm sessile polyp in the descending colon and 2 in the rectum were removed with cold  biopsie forceps.  A retroflex examination within the  rectum revealed internal hemorrhoids.        The scope was then straightened, the lower GI tract was decompressed, and the scope was pulled out of the patient.  The patient tolerated the procedure well.  There were no immediate complications and the patient was transferred in stable condition for post procedure observation.      TECHNICAL DATA:   1. Murphys prep score: 7 (3+2+2).    2. Anus to cecal time: 7  minutes.  3. Difficulty of examination:  Average.  The colon was noted to be tortuous, redundant and spastic.  Multiple loops were reduced.  4. Withdrawal time: 20 minutes.  5. Procedure time: 43 minutes  6. Retroflex examination in right colon: Yes.    7. Second look Rectum to cecum with decompression: Yes.    DIAGNOSES:    1. Left-sided diverticulosis.  2. Colon polyps.  5 were removed.  3-5 mm in size.  3. Internal hemorrhoids.  The colon was noted to be tortuous, redundant and spastic.  Multiple loops were reduced.  Prep score 7.    RECOMMENDATIONS:     1. Dietary instructions.  2. Follow biopsies.    3. Follow-up:    The patient may call back in 1 week regarding biopsy results.  However, if desired to follow-up in person, the patient may follow-up in 3 to 4 weeks in the office.  4. Followup colonoscopy: Preferably in 3 years in view of significantly tortuous, redundant and spastic colon requiring reduction of multiple loops and relative low prep score of 7.       Thank you very much for letting me participate in the care of this patient. Please do not hesitate to call me if you have any questions.

## 2020-02-12 NOTE — ANESTHESIA PREPROCEDURE EVALUATION
Anesthesia Evaluation     Patient summary reviewed and Nursing notes reviewed   NPO Solid Status: > 8 hours  NPO Liquid Status: > 8 hours           Airway   Mallampati: II  TM distance: >3 FB  Neck ROM: full  No difficulty expected  Dental      Pulmonary - normal exam   Cardiovascular - normal exam        Neuro/Psych  GI/Hepatic/Renal/Endo      Musculoskeletal     Abdominal  - normal exam   Substance History      OB/GYN          Other                        Anesthesia Plan    ASA 2     MAC     intravenous induction       Plan discussed with CRNA.

## 2020-02-12 NOTE — ANESTHESIA POSTPROCEDURE EVALUATION
Patient: El Greenwood    Procedure Summary     Date:  02/12/20 Room / Location:  [facility] ENDOSCOPY 2 / [facility] ENDOSCOPY    Anesthesia Start:  0859 Anesthesia Stop:  0955    Procedure:  COLONOSCOPY (N/A Anus) Diagnosis:       Diverticulosis      Colon polyps      Internal hemorrhoid      (Colon cancer screening [Z12.11])    Surgeon:  Pavel Girard MD Provider:  Marlon Bacon CRNA    Anesthesia Type:  MAC ASA Status:  2          Anesthesia Type: MAC    Vitals  Vitals Value Taken Time   /73 2/12/2020  9:56 AM   Temp 97.4 °F (36.3 °C) 2/12/2020  9:56 AM   Pulse 71 2/12/2020  9:56 AM   Resp 16 2/12/2020  9:56 AM   SpO2 97 % 2/12/2020  9:56 AM           Post Anesthesia Care and Evaluation    Patient location during evaluation: bedside  Patient participation: complete - patient participated  Level of consciousness: awake  Pain score: 0  Pain management: adequate  Airway patency: patent  Anesthetic complications: No anesthetic complications  PONV Status: controlled  Cardiovascular status: acceptable and stable  Respiratory status: acceptable and room air  Hydration status: acceptable

## 2020-02-12 NOTE — DISCHARGE INSTRUCTIONS
To assist you in voiding:  Drink plenty of fluids  Listen to running water while attempting to void.    If you are unable to urinate and you have an uncomfortable urge to void or it has been   6 hours since you were discharged, return to the Emergency Room.    Rest today  No pushing,pulling,tugging,heavy lifting, or strenuous activity   No major decision making,driving,or drinking alcoholic beverages for 24 hours due to the sedation you received  Always use good hand hygiene/washing technique  No driving on pain medication.    ************************************************************************************    Postprocedure instructions:    1. Nothing by mouth to fully alert.  2. Once fully alert may have clear liquid diet.  3. Advance diet as tolerated.  4. Vital signs as routine.    Diet:     1. Low-fat diet.      Blood Thinner Directions:    Avoid Aspirin & other NSAIDS for _7__ days. Tylenol is okay.    Treatments:      Other Instructions:    Call [facility] at [phone] or come to the Emergency Department if you experience the following: Chest pain, abdominal pain, bleeding (vomiting of blood or coffee colored material, black stools or jeremias blood in stools), fever/chills, nausea and vomiting or dizziness.      Follow-up:  The patient may call back in 1 week regarding biopsy results.  However, if desired to follow-up in person, the patient may follow-up in 3 to 4 weeks in the office.      Follow-up colonoscopy: Preferably in 3 years.  ************************************************************************************    Notes to the patient and the family from Dr. Girard.    Dear patient/family member,    Today your colon was examined extensively from rectum to cecum and beyond into the small intestine twice.   Findings on today's procedure are as follows:    1. Left-sided diverticulosis. These are benign outpouchings in the colon.   2. Colon polyps. 5 were found and removed.       3. No  cancer. No inflammation or colitis. No suggestion of Crohn's disease.      4. Internal hemorrhoids.      5. Your colon was noted to be significantly tortuous, redundant and spastic.      Recommendations:    1. As above.        Should you have more questions please do not hesitate to talk to the nurse who can call me and let me talk to you.      I hope you feel better.    Pavel Girard M.D., FACP, FACG.

## 2020-02-12 NOTE — H&P
"Chief complaint:  Colon CA Screen (Open)    History of present illness: Colon Cancer Screen, Last Colonoscopy 2009      Past medical history:   History reviewed. No pertinent past medical history.    Surgical history:    Past Surgical History:   Procedure Laterality Date   • COLONOSCOPY  2009   • TONSILLECTOMY     • VASECTOMY         Social history:  Social History     Socioeconomic History   • Marital status:      Spouse name: Not on file   • Number of children: Not on file   • Years of education: Not on file   • Highest education level: Not on file   Tobacco Use   • Smoking status: Former Smoker   • Smokeless tobacco: Never Used   Substance and Sexual Activity   • Alcohol use: Yes     Alcohol/week: 7.0 standard drinks     Types: 2 Cans of beer, 5 Standard drinks or equivalent per week     Comment: 3-7 per week   • Drug use: No   • Sexual activity: Defer       Allergies:  Patient has no known allergies.  Latex allergy: None  Contrast allergy: None    Medications:  Medications Prior to Admission   Medication Sig Dispense Refill Last Dose   • Multiple Vitamins-Minerals (MULTIVITAMIN PO) Take  by mouth.   2/10/2020       Review of systems:   Constitutional: No recent: Fever, Weight loss  Respiratory: No recent: SOB, Cough  Cardiovascular: No recent: Chest Pains, congestive heart failure or arrhythmias.   Neurological: No recent: Seizures, CVA, TIA.   Genitourinary: No recent: Renal Failure, UTI.  Endocrine: No recent: Worsening of diabetes or thyroid disease.  Musculoskeletal:   No recent: Joint swelling.  Hem. Oncology: No recent: Anemia or bleeding.  Psychiatric: No recent: Worsening of depression or anxiety.     VITAL SIGNS:    Blood pressure 154/93, pulse 113, temperature 98.6 °F (37 °C), temperature source Temporal, resp. rate 16, height 182.9 cm (72\"), weight 79.5 kg (175 lb 4 oz), SpO2 96 %.    PHYSICAL EXAMINATION:   HEENT: Normal.   Lungs: Clear to auscultation.  Heart: No S3, no " murmur.    Abdomen: Soft.  BS+ ND, NT  Extremities: No edema.  No cyanosis.  Neuro: Alert X 3. No focal deficit.    Assessment: Colon Cancer Screen, Last Colonoscopy 2009      Plan:  Colonoscopy (Open)     Risks/Benefits:  The potential benefits, risk and/or side effects of the procedure and alternatives have been discussed with the patient/authorized representative and questions were answered.

## 2020-02-17 LAB
LAB AP CASE REPORT: NORMAL
PATH REPORT.FINAL DX SPEC: NORMAL

## 2020-10-20 ENCOUNTER — FLU SHOT (OUTPATIENT)
Dept: INTERNAL MEDICINE | Facility: CLINIC | Age: 60
End: 2020-10-20

## 2020-10-20 DIAGNOSIS — Z23 NEED FOR INFLUENZA VACCINATION: ICD-10-CM

## 2020-10-20 PROCEDURE — 90471 IMMUNIZATION ADMIN: CPT | Performed by: INTERNAL MEDICINE

## 2020-10-20 PROCEDURE — 90686 IIV4 VACC NO PRSV 0.5 ML IM: CPT | Performed by: INTERNAL MEDICINE

## 2020-12-07 ENCOUNTER — OFFICE VISIT (OUTPATIENT)
Dept: INTERNAL MEDICINE | Facility: CLINIC | Age: 60
End: 2020-12-07

## 2020-12-07 VITALS
WEIGHT: 179 LBS | OXYGEN SATURATION: 96 % | TEMPERATURE: 97.7 F | SYSTOLIC BLOOD PRESSURE: 114 MMHG | HEART RATE: 94 BPM | HEIGHT: 72 IN | DIASTOLIC BLOOD PRESSURE: 74 MMHG | BODY MASS INDEX: 24.24 KG/M2

## 2020-12-07 DIAGNOSIS — Z00.00 HEALTHCARE MAINTENANCE: Primary | ICD-10-CM

## 2020-12-07 DIAGNOSIS — L21.9 SEBORRHEIC DERMATITIS: ICD-10-CM

## 2020-12-07 DIAGNOSIS — M54.50 LOW BACK PAIN WITHOUT SCIATICA, UNSPECIFIED BACK PAIN LATERALITY, UNSPECIFIED CHRONICITY: ICD-10-CM

## 2020-12-07 DIAGNOSIS — Z12.11 COLON CANCER SCREENING: ICD-10-CM

## 2020-12-07 PROCEDURE — 99396 PREV VISIT EST AGE 40-64: CPT | Performed by: INTERNAL MEDICINE

## 2020-12-07 PROCEDURE — 90471 IMMUNIZATION ADMIN: CPT | Performed by: INTERNAL MEDICINE

## 2020-12-07 PROCEDURE — 90715 TDAP VACCINE 7 YRS/> IM: CPT | Performed by: INTERNAL MEDICINE

## 2020-12-07 NOTE — PROGRESS NOTES
Subjective   El Greenwood is a 60 y.o. male and is here for a comprehensive physical exam.     Do you take any herbs or supplements that were not prescribed by a doctor? no  Are you taking calcium supplements? no  Are you taking aspirin daily? no      The following portions of the patient's history were reviewed and updated as appropriate: allergies, current medications, past family history, past medical history, past social history, past surgical history and problem list.      Review of Systems   Constitutional: Negative.    HENT: Negative.    Eyes: Negative.    Respiratory: Negative.    Cardiovascular: Negative.    Gastrointestinal: Negative.    Endocrine: Negative.    Genitourinary: Negative.    Musculoskeletal: Negative.    Skin: Negative.    Allergic/Immunologic: Negative.    Neurological: Negative.    Hematological: Negative.    Psychiatric/Behavioral: Negative.    All other systems reviewed and are negative.        Physical Exam  Constitutional:       Appearance: He is well-developed.   HENT:      Head: Normocephalic.      Nose: Nose normal.      Mouth/Throat:      Mouth: Mucous membranes are moist.      Pharynx: Oropharynx is clear.   Eyes:      Conjunctiva/sclera: Conjunctivae normal.      Pupils: Pupils are equal, round, and reactive to light.   Neck:      Musculoskeletal: Normal range of motion and neck supple.   Cardiovascular:      Rate and Rhythm: Normal rate and regular rhythm.      Heart sounds: Normal heart sounds.   Pulmonary:      Effort: Pulmonary effort is normal.      Breath sounds: Normal breath sounds.   Abdominal:      General: Bowel sounds are normal.      Palpations: Abdomen is soft.   Skin:     General: Skin is warm.   Neurological:      Mental Status: He is alert and oriented to person, place, and time.   Psychiatric:         Behavior: Behavior normal.         All  tests have been reviewed.    Assessment/Plan          1. Patient Counseling:  --Nutrition: Stressed importance of  moderation in sodium/caffeine intake, saturated fat and cholesterol, caloric balance, sufficient intake of fresh fruits, vegetables, fiber, calcium and iron.  --Exercise: Stressed the importance of regular exercise.   --Injury prevention: Discussed safety belts, safety helmets, smoke detector, smoking near bedding or upholstery.   --Dental health: Discussed importance of regular tooth brushing, flossing, and dental visits.  --Immunizations reviewed.  --Discussed benefits of screening colonoscopy.  --After hours service discussed with patient    2. Discussed the patient's BMI with him.             facial SD continue ointment  LBP XR mild DJD  hematuria, history of work up for decades.obtain records, patient to get.   Floater follow up with eye doc   vitD low , continue vitD3 2000u daily  solar lentigo, watch   tdap 12/2011, flushot done,   vasectomy history  Colon 2/12/20, REPEAT 3 YEAR  TDAP TODAY   AND SHINGLE SHOT TO pHARMACY     Do lab now

## 2020-12-08 ENCOUNTER — TELEPHONE (OUTPATIENT)
Dept: INTERNAL MEDICINE | Facility: CLINIC | Age: 60
End: 2020-12-08

## 2020-12-08 NOTE — TELEPHONE ENCOUNTER
Left detailed vm stating that Dr. Ibrahim placed pt labs to get drawn and he can get these drawn anytime fasting.

## 2020-12-08 NOTE — TELEPHONE ENCOUNTER
PATIENT STATES THAT WHEN HE HAS AN APPOINTMENT HIS DOCTOR HAS HIM COME IN THE NEXT DAY OR THE DAY AFTER FOR LABS. THEY DID NOT DISCUSS THIS AT HIS APPOINTMENT YESTERDAY.PATIENT WANTS TO KNOW IF HE NEEDS TO HAVE BLOOD WORK AND URINALYSIS DONE.    CALL BACK [phone]

## 2020-12-09 LAB
25(OH)D3+25(OH)D2 SERPL-MCNC: 27.1 NG/ML (ref 30–100)
ALBUMIN SERPL-MCNC: 4.4 G/DL (ref 3.5–5.2)
ALBUMIN/GLOB SERPL: 1.9 G/DL
ALP SERPL-CCNC: 83 U/L (ref 39–117)
ALT SERPL-CCNC: 17 U/L (ref 1–41)
AST SERPL-CCNC: 17 U/L (ref 1–40)
BASOPHILS # BLD AUTO: 0.05 10*3/MM3 (ref 0–0.2)
BASOPHILS NFR BLD AUTO: 0.8 % (ref 0–1.5)
BILIRUB SERPL-MCNC: 0.7 MG/DL (ref 0–1.2)
BUN SERPL-MCNC: 17 MG/DL (ref 8–23)
BUN/CREAT SERPL: 15.7 (ref 7–25)
CALCIUM SERPL-MCNC: 9.3 MG/DL (ref 8.6–10.5)
CHLORIDE SERPL-SCNC: 102 MMOL/L (ref 98–107)
CHOLEST SERPL-MCNC: 209 MG/DL (ref 0–200)
CO2 SERPL-SCNC: 31.7 MMOL/L (ref 22–29)
CREAT SERPL-MCNC: 1.08 MG/DL (ref 0.76–1.27)
EOSINOPHIL # BLD AUTO: 0.14 10*3/MM3 (ref 0–0.4)
EOSINOPHIL NFR BLD AUTO: 2.4 % (ref 0.3–6.2)
ERYTHROCYTE [DISTWIDTH] IN BLOOD BY AUTOMATED COUNT: 12.4 % (ref 12.3–15.4)
GLOBULIN SER CALC-MCNC: 2.3 GM/DL
GLUCOSE SERPL-MCNC: 100 MG/DL (ref 65–99)
HCT VFR BLD AUTO: 43.4 % (ref 37.5–51)
HDLC SERPL-MCNC: 45 MG/DL (ref 40–60)
HGB BLD-MCNC: 14.9 G/DL (ref 13–17.7)
IMM GRANULOCYTES # BLD AUTO: 0.01 10*3/MM3 (ref 0–0.05)
IMM GRANULOCYTES NFR BLD AUTO: 0.2 % (ref 0–0.5)
LDLC SERPL CALC-MCNC: 136 MG/DL (ref 0–100)
LYMPHOCYTES # BLD AUTO: 2.08 10*3/MM3 (ref 0.7–3.1)
LYMPHOCYTES NFR BLD AUTO: 35 % (ref 19.6–45.3)
MCH RBC QN AUTO: 30.6 PG (ref 26.6–33)
MCHC RBC AUTO-ENTMCNC: 34.3 G/DL (ref 31.5–35.7)
MCV RBC AUTO: 89.1 FL (ref 79–97)
MONOCYTES # BLD AUTO: 0.56 10*3/MM3 (ref 0.1–0.9)
MONOCYTES NFR BLD AUTO: 9.4 % (ref 5–12)
NEUTROPHILS # BLD AUTO: 3.1 10*3/MM3 (ref 1.7–7)
NEUTROPHILS NFR BLD AUTO: 52.2 % (ref 42.7–76)
NRBC BLD AUTO-RTO: 0 /100 WBC (ref 0–0.2)
PLATELET # BLD AUTO: 209 10*3/MM3 (ref 140–450)
POTASSIUM SERPL-SCNC: 4.2 MMOL/L (ref 3.5–5.2)
PROT SERPL-MCNC: 6.7 G/DL (ref 6–8.5)
PSA SERPL-MCNC: 0.51 NG/ML (ref 0–4)
RBC # BLD AUTO: 4.87 10*6/MM3 (ref 4.14–5.8)
SODIUM SERPL-SCNC: 141 MMOL/L (ref 136–145)
TRIGL SERPL-MCNC: 157 MG/DL (ref 0–150)
TSH SERPL DL<=0.005 MIU/L-ACNC: 1.32 UIU/ML (ref 0.27–4.2)
VLDLC SERPL CALC-MCNC: 28 MG/DL (ref 5–40)
WBC # BLD AUTO: 5.94 10*3/MM3 (ref 3.4–10.8)

## 2021-10-07 ENCOUNTER — LAB (OUTPATIENT)
Dept: LAB | Facility: HOSPITAL | Age: 61
End: 2021-10-07

## 2021-10-07 ENCOUNTER — TRANSCRIBE ORDERS (OUTPATIENT)
Dept: LAB | Facility: HOSPITAL | Age: 61
End: 2021-10-07

## 2021-10-07 DIAGNOSIS — Z20.822 COVID-19 RULED OUT: Primary | ICD-10-CM

## 2021-10-07 DIAGNOSIS — Z20.822 COVID-19 RULED OUT: ICD-10-CM

## 2021-10-07 PROCEDURE — U0004 COV-19 TEST NON-CDC HGH THRU: HCPCS

## 2021-10-08 LAB — SARS-COV-2 RNA NOSE QL NAA+PROBE: NOT DETECTED

## 2021-12-09 ENCOUNTER — OFFICE VISIT (OUTPATIENT)
Dept: INTERNAL MEDICINE | Facility: CLINIC | Age: 61
End: 2021-12-09

## 2021-12-09 VITALS
SYSTOLIC BLOOD PRESSURE: 124 MMHG | OXYGEN SATURATION: 98 % | BODY MASS INDEX: 23.68 KG/M2 | WEIGHT: 174.8 LBS | HEART RATE: 104 BPM | HEIGHT: 72 IN | TEMPERATURE: 98 F | DIASTOLIC BLOOD PRESSURE: 80 MMHG

## 2021-12-09 DIAGNOSIS — Z00.00 ANNUAL PHYSICAL EXAM: Primary | ICD-10-CM

## 2021-12-09 DIAGNOSIS — H61.22 IMPACTED CERUMEN OF LEFT EAR: ICD-10-CM

## 2021-12-09 DIAGNOSIS — R21 SKIN RASH: ICD-10-CM

## 2021-12-09 PROCEDURE — 99396 PREV VISIT EST AGE 40-64: CPT | Performed by: INTERNAL MEDICINE

## 2021-12-09 PROCEDURE — 69209 REMOVE IMPACTED EAR WAX UNI: CPT | Performed by: INTERNAL MEDICINE

## 2021-12-09 PROCEDURE — 90686 IIV4 VACC NO PRSV 0.5 ML IM: CPT | Performed by: INTERNAL MEDICINE

## 2021-12-09 PROCEDURE — 90471 IMMUNIZATION ADMIN: CPT | Performed by: INTERNAL MEDICINE

## 2021-12-09 NOTE — PROGRESS NOTES
Subjective   El Greenwood is a 61 y.o. male and is here for a comprehensive physical exam.     Do you take any herbs or supplements that were not prescribed by a doctor? no  Are you taking calcium supplements? no  Are you taking aspirin daily? no      The following portions of the patient's history were reviewed and updated as appropriate: allergies, current medications, past family history, past medical history, past social history, past surgical history and problem list.      Review of Systems   Constitutional: Negative.    HENT: Negative.    Eyes: Negative.    Respiratory: Negative.    Cardiovascular: Negative.    Gastrointestinal: Negative.    Endocrine: Negative.    Genitourinary: Negative.    Musculoskeletal: Negative.    Skin: Negative.    Allergic/Immunologic: Negative.    Neurological: Negative.    Hematological: Negative.    Psychiatric/Behavioral: Negative.    All other systems reviewed and are negative.        Physical Exam  Vitals and nursing note reviewed.   Constitutional:       Appearance: Normal appearance. He is well-developed and normal weight.   HENT:      Head: Normocephalic and atraumatic.      Right Ear: Tympanic membrane, ear canal and external ear normal.      Left Ear: External ear normal.      Ears:      Comments: Left cerumen impaction     Nose: Nose normal.      Mouth/Throat:      Mouth: Mucous membranes are moist.      Pharynx: Oropharynx is clear.   Eyes:      Conjunctiva/sclera: Conjunctivae normal.      Pupils: Pupils are equal, round, and reactive to light.   Neck:      Thyroid: No thyromegaly.   Cardiovascular:      Rate and Rhythm: Normal rate and regular rhythm.      Heart sounds: Normal heart sounds.   Pulmonary:      Effort: Pulmonary effort is normal.      Breath sounds: Normal breath sounds.   Abdominal:      General: Bowel sounds are normal.      Palpations: Abdomen is soft.   Genitourinary:     Penis: Normal.       Testes: Normal.      Prostate: Normal.      Rectum:  Normal.   Musculoskeletal:         General: Normal range of motion.      Cervical back: Normal range of motion and neck supple.   Skin:     General: Skin is warm and dry.   Neurological:      Mental Status: He is alert and oriented to person, place, and time.      Deep Tendon Reflexes: Reflexes are normal and symmetric.   Psychiatric:         Behavior: Behavior normal.         Thought Content: Thought content normal.         Judgment: Judgment normal.           All  tests have been reviewed.    Assessment/Plan          1. Patient Counseling:  --Nutrition: Stressed importance of moderation in sodium/caffeine intake, saturated fat and cholesterol, caloric balance, sufficient intake of fresh fruits, vegetables, fiber, calcium and iron.  --Exercise: Stressed the importance of regular exercise.   --Injury prevention: Discussed safety belts, safety helmets, smoke detector, smoking near bedding or upholstery.   --Dental health: Discussed importance of regular tooth brushing, flossing, and dental visits.  --Immunizations reviewed.  --Discussed benefits of screening colonoscopy.  --After hours service discussed with patient    2. Discussed the patient's BMI with him.      Ear Cerumen Removal    Date/Time: 12/9/2021 3:37 PM  Performed by: Jose Ibrahim MD  Authorized by: Jose Ibrahim MD   Consent: Verbal consent obtained. Written consent not obtained.  Risks and benefits: risks, benefits and alternatives were discussed  Consent given by: patient  Patient understanding: patient states understanding of the procedure being performed  Patient consent: the patient's understanding of the procedure matches consent given    Anesthesia:  Local Anesthetic: none  Location details: left ear  Procedure type: irrigation   Sedation:  Patient sedated: yes               facial SD continue ointment  LBP XR mild DJD  Skin rash ref to derm  hematuria, history of work up for decades.obtain records, patient to get.   Floater follow up with eye  doc   vitD low , continue vitD3 2000u daily  solar lentigo, watch   tdap 12/2011, flushot done,   vasectomy history  Colon 2/12/20, REPEAT 3 YEAR   AND SHINGLE SHOT TO [facility]Y  Annual physical

## 2021-12-17 LAB
25(OH)D3+25(OH)D2 SERPL-MCNC: 24.4 NG/ML (ref 30–100)
ALBUMIN SERPL-MCNC: 4 G/DL (ref 3.5–5.2)
ALBUMIN/GLOB SERPL: 1.7 G/DL
ALP SERPL-CCNC: 72 U/L (ref 39–117)
ALT SERPL-CCNC: 16 U/L (ref 1–41)
AST SERPL-CCNC: 23 U/L (ref 1–40)
BASOPHILS # BLD AUTO: 0.03 10*3/MM3 (ref 0–0.2)
BASOPHILS NFR BLD AUTO: 0.6 % (ref 0–1.5)
BILIRUB SERPL-MCNC: 0.8 MG/DL (ref 0–1.2)
BUN SERPL-MCNC: 18 MG/DL (ref 8–23)
BUN/CREAT SERPL: 17 (ref 7–25)
CALCIUM SERPL-MCNC: 9.2 MG/DL (ref 8.6–10.5)
CHLORIDE SERPL-SCNC: 104 MMOL/L (ref 98–107)
CHOLEST SERPL-MCNC: 187 MG/DL (ref 0–200)
CO2 SERPL-SCNC: 31.6 MMOL/L (ref 22–29)
CREAT SERPL-MCNC: 1.06 MG/DL (ref 0.76–1.27)
EOSINOPHIL # BLD AUTO: 0.12 10*3/MM3 (ref 0–0.4)
EOSINOPHIL NFR BLD AUTO: 2.3 % (ref 0.3–6.2)
ERYTHROCYTE [DISTWIDTH] IN BLOOD BY AUTOMATED COUNT: 12.2 % (ref 12.3–15.4)
GLOBULIN SER CALC-MCNC: 2.4 GM/DL
GLUCOSE SERPL-MCNC: 99 MG/DL (ref 65–99)
HCT VFR BLD AUTO: 41.8 % (ref 37.5–51)
HDLC SERPL-MCNC: 46 MG/DL (ref 40–60)
HGB BLD-MCNC: 13.8 G/DL (ref 13–17.7)
IMM GRANULOCYTES # BLD AUTO: 0.02 10*3/MM3 (ref 0–0.05)
IMM GRANULOCYTES NFR BLD AUTO: 0.4 % (ref 0–0.5)
LDLC SERPL CALC-MCNC: 123 MG/DL (ref 0–100)
LYMPHOCYTES # BLD AUTO: 2.3 10*3/MM3 (ref 0.7–3.1)
LYMPHOCYTES NFR BLD AUTO: 44.6 % (ref 19.6–45.3)
MCH RBC QN AUTO: 29.7 PG (ref 26.6–33)
MCHC RBC AUTO-ENTMCNC: 33 G/DL (ref 31.5–35.7)
MCV RBC AUTO: 90.1 FL (ref 79–97)
MONOCYTES # BLD AUTO: 0.54 10*3/MM3 (ref 0.1–0.9)
MONOCYTES NFR BLD AUTO: 10.5 % (ref 5–12)
NEUTROPHILS # BLD AUTO: 2.15 10*3/MM3 (ref 1.7–7)
NEUTROPHILS NFR BLD AUTO: 41.6 % (ref 42.7–76)
NRBC BLD AUTO-RTO: 0 /100 WBC (ref 0–0.2)
PLATELET # BLD AUTO: 180 10*3/MM3 (ref 140–450)
POTASSIUM SERPL-SCNC: 4.5 MMOL/L (ref 3.5–5.2)
PROT SERPL-MCNC: 6.4 G/DL (ref 6–8.5)
PSA SERPL-MCNC: 0.53 NG/ML (ref 0–4)
RBC # BLD AUTO: 4.64 10*6/MM3 (ref 4.14–5.8)
SODIUM SERPL-SCNC: 144 MMOL/L (ref 136–145)
TRIGL SERPL-MCNC: 97 MG/DL (ref 0–150)
TSH SERPL DL<=0.005 MIU/L-ACNC: 1.19 UIU/ML (ref 0.27–4.2)
VLDLC SERPL CALC-MCNC: 18 MG/DL (ref 5–40)
WBC # BLD AUTO: 5.16 10*3/MM3 (ref 3.4–10.8)

## 2022-12-13 ENCOUNTER — OFFICE VISIT (OUTPATIENT)
Dept: INTERNAL MEDICINE | Facility: CLINIC | Age: 62
End: 2022-12-13

## 2022-12-13 VITALS
DIASTOLIC BLOOD PRESSURE: 80 MMHG | BODY MASS INDEX: 24.65 KG/M2 | TEMPERATURE: 96.9 F | HEIGHT: 72 IN | OXYGEN SATURATION: 98 % | WEIGHT: 182 LBS | SYSTOLIC BLOOD PRESSURE: 132 MMHG | HEART RATE: 69 BPM

## 2022-12-13 DIAGNOSIS — L81.9 DISCOLORATION OF SKIN OF FACE: ICD-10-CM

## 2022-12-13 DIAGNOSIS — Z00.00 HEALTHCARE MAINTENANCE: Primary | ICD-10-CM

## 2022-12-13 PROCEDURE — 90471 IMMUNIZATION ADMIN: CPT | Performed by: INTERNAL MEDICINE

## 2022-12-13 PROCEDURE — 99396 PREV VISIT EST AGE 40-64: CPT | Performed by: INTERNAL MEDICINE

## 2022-12-13 PROCEDURE — 90686 IIV4 VACC NO PRSV 0.5 ML IM: CPT | Performed by: INTERNAL MEDICINE

## 2022-12-13 NOTE — PROGRESS NOTES
Subjective   El Greenwood is a 62 y.o. male and is here for a comprehensive physical exam.     Do you take any herbs or supplements that were not prescribed by a doctor? no  Are you taking calcium supplements? no  Are you taking aspirin daily? no      The following portions of the patient's history were reviewed and updated as appropriate: allergies, current medications, past family history, past medical history, past social history, past surgical history and problem list.    Patient Active Problem List   Diagnosis   • Low back pain   • Seborrheic dermatitis   • Healthcare maintenance   • Colon cancer screening       Review of Systems   Constitutional: Negative.    HENT: Negative.    Eyes: Negative.    Respiratory: Negative.    Cardiovascular: Negative.    Gastrointestinal: Negative.    Endocrine: Negative.    Genitourinary: Negative.    Musculoskeletal: Negative.    Skin: Negative.    Allergic/Immunologic: Negative.    Neurological: Negative.    Hematological: Negative.    Psychiatric/Behavioral: Negative.    All other systems reviewed and are negative.      Physical Exam  Vitals and nursing note reviewed.   Constitutional:       Appearance: Normal appearance. He is well-developed and normal weight.   HENT:      Head: Normocephalic and atraumatic.      Right Ear: Tympanic membrane, ear canal and external ear normal.      Left Ear: Tympanic membrane, ear canal and external ear normal.      Nose: Nose normal.      Mouth/Throat:      Mouth: Mucous membranes are moist.      Pharynx: Oropharynx is clear.   Eyes:      Conjunctiva/sclera: Conjunctivae normal.      Pupils: Pupils are equal, round, and reactive to light.   Neck:      Thyroid: No thyromegaly.   Cardiovascular:      Rate and Rhythm: Normal rate and regular rhythm.      Heart sounds: Normal heart sounds.   Pulmonary:      Effort: Pulmonary effort is normal.      Breath sounds: Normal breath sounds.   Abdominal:      General: Bowel sounds are normal.       Palpations: Abdomen is soft.   Genitourinary:     Penis: Normal.       Testes: Normal.      Prostate: Normal.      Rectum: Normal.   Musculoskeletal:         General: Normal range of motion.      Cervical back: Normal range of motion and neck supple.   Skin:     General: Skin is warm and dry.   Neurological:      Mental Status: He is alert and oriented to person, place, and time.      Deep Tendon Reflexes: Reflexes are normal and symmetric.   Psychiatric:         Mood and Affect: Mood normal.         Behavior: Behavior normal.         Thought Content: Thought content normal.         Judgment: Judgment normal.         All  tests have been reviewed.    Assessment & Plan          1. Patient Counseling:  --Nutrition: Stressed importance of moderation in sodium/caffeine intake, saturated fat and cholesterol, caloric balance, sufficient intake of fresh fruits, vegetables, fiber, calcium and iron.  --Exercise: Stressed the importance of regular exercise.   --Injury prevention: Discussed safety belts, safety helmets, smoke detector, smoking near bedding or upholstery.   --Dental health: Discussed importance of regular tooth brushing, flossing, and dental visits.  --Immunizations reviewed.  --Discussed benefits of screening colonoscopy.  --After hours service discussed with patient    2. Discussed the patient's BMI with him.            facial SD continue ointment  Facial discolor ref to derm   LBP XR mild DJD stable now  hematuria, history of work up for decades.obtain records, patient to get.   Floater follow up with eye doc   vitD low , cont supplement  solar lentigo, watch   tdap 12/2011, flushot done,   vasectomy history  Colon 2/12/20, REPEAT 3 YEAR   AND SHINGLE SHOT TO [facility]Y  Annual physical

## 2022-12-16 LAB
ALBUMIN SERPL-MCNC: 4.3 G/DL (ref 3.5–5.2)
ALBUMIN/GLOB SERPL: 1.8 G/DL
ALP SERPL-CCNC: 78 U/L (ref 39–117)
ALT SERPL-CCNC: 23 U/L (ref 1–41)
APPEARANCE UR: CLEAR
AST SERPL-CCNC: 24 U/L (ref 1–40)
BACTERIA #/AREA URNS HPF: ABNORMAL /HPF
BASOPHILS # BLD AUTO: 0.04 10*3/MM3 (ref 0–0.2)
BASOPHILS NFR BLD AUTO: 0.7 % (ref 0–1.5)
BILIRUB SERPL-MCNC: 0.6 MG/DL (ref 0–1.2)
BILIRUB UR QL STRIP: NEGATIVE
BUN SERPL-MCNC: 23 MG/DL (ref 8–23)
BUN/CREAT SERPL: 20 (ref 7–25)
CALCIUM SERPL-MCNC: 9.4 MG/DL (ref 8.6–10.5)
CASTS URNS MICRO: ABNORMAL
CHLORIDE SERPL-SCNC: 105 MMOL/L (ref 98–107)
CHOLEST SERPL-MCNC: 208 MG/DL (ref 0–200)
CO2 SERPL-SCNC: 29.8 MMOL/L (ref 22–29)
COLOR UR: YELLOW
CREAT SERPL-MCNC: 1.15 MG/DL (ref 0.76–1.27)
EGFRCR SERPLBLD CKD-EPI 2021: 71.5 ML/MIN/1.73
EOSINOPHIL # BLD AUTO: 0.17 10*3/MM3 (ref 0–0.4)
EOSINOPHIL NFR BLD AUTO: 3 % (ref 0.3–6.2)
EPI CELLS #/AREA URNS HPF: ABNORMAL /HPF
ERYTHROCYTE [DISTWIDTH] IN BLOOD BY AUTOMATED COUNT: 12.2 % (ref 12.3–15.4)
GLOBULIN SER CALC-MCNC: 2.4 GM/DL
GLUCOSE SERPL-MCNC: 94 MG/DL (ref 65–99)
GLUCOSE UR QL STRIP: NEGATIVE
HCT VFR BLD AUTO: 43.9 % (ref 37.5–51)
HDLC SERPL-MCNC: 44 MG/DL (ref 40–60)
HGB BLD-MCNC: 14.4 G/DL (ref 13–17.7)
HGB UR QL STRIP: ABNORMAL
IMM GRANULOCYTES # BLD AUTO: 0.01 10*3/MM3 (ref 0–0.05)
IMM GRANULOCYTES NFR BLD AUTO: 0.2 % (ref 0–0.5)
KETONES UR QL STRIP: NEGATIVE
LDLC SERPL CALC-MCNC: 144 MG/DL (ref 0–100)
LEUKOCYTE ESTERASE UR QL STRIP: NEGATIVE
LYMPHOCYTES # BLD AUTO: 2.18 10*3/MM3 (ref 0.7–3.1)
LYMPHOCYTES NFR BLD AUTO: 37.8 % (ref 19.6–45.3)
MCH RBC QN AUTO: 29.5 PG (ref 26.6–33)
MCHC RBC AUTO-ENTMCNC: 32.8 G/DL (ref 31.5–35.7)
MCV RBC AUTO: 90 FL (ref 79–97)
MONOCYTES # BLD AUTO: 0.6 10*3/MM3 (ref 0.1–0.9)
MONOCYTES NFR BLD AUTO: 10.4 % (ref 5–12)
NEUTROPHILS # BLD AUTO: 2.76 10*3/MM3 (ref 1.7–7)
NEUTROPHILS NFR BLD AUTO: 47.9 % (ref 42.7–76)
NITRITE UR QL STRIP: NEGATIVE
NRBC BLD AUTO-RTO: 0 /100 WBC (ref 0–0.2)
PH UR STRIP: 6 [PH] (ref 5–8)
PLATELET # BLD AUTO: 200 10*3/MM3 (ref 140–450)
POTASSIUM SERPL-SCNC: 4.3 MMOL/L (ref 3.5–5.2)
PROT SERPL-MCNC: 6.7 G/DL (ref 6–8.5)
PROT UR QL STRIP: ABNORMAL
PSA SERPL-MCNC: 0.61 NG/ML (ref 0–4)
RBC # BLD AUTO: 4.88 10*6/MM3 (ref 4.14–5.8)
RBC #/AREA URNS HPF: ABNORMAL /HPF
SODIUM SERPL-SCNC: 141 MMOL/L (ref 136–145)
SP GR UR STRIP: 1.02 (ref 1–1.03)
TRIGL SERPL-MCNC: 110 MG/DL (ref 0–150)
TSH SERPL DL<=0.005 MIU/L-ACNC: 1.5 UIU/ML (ref 0.27–4.2)
UROBILINOGEN UR STRIP-MCNC: ABNORMAL MG/DL
VLDLC SERPL CALC-MCNC: 20 MG/DL (ref 5–40)
WBC # BLD AUTO: 5.76 10*3/MM3 (ref 3.4–10.8)
WBC #/AREA URNS HPF: ABNORMAL /HPF

## 2023-07-18 PROBLEM — R19.5 LOOSE STOOLS: Status: ACTIVE | Noted: 2023-07-18

## 2023-07-18 PROBLEM — E55.9 VITAMIN D DEFICIENCY: Status: ACTIVE | Noted: 2023-07-18

## 2023-07-18 PROBLEM — N52.8 OTHER MALE ERECTILE DYSFUNCTION: Status: ACTIVE | Noted: 2023-07-18

## 2023-07-18 PROBLEM — R03.0 ELEVATED BP WITHOUT DIAGNOSIS OF HYPERTENSION: Status: ACTIVE | Noted: 2023-07-18

## 2023-07-27 ENCOUNTER — TREATMENT (OUTPATIENT)
Dept: PHYSICAL THERAPY | Facility: CLINIC | Age: 63
End: 2023-07-27
Payer: COMMERCIAL

## 2023-07-29 NOTE — PROGRESS NOTES
Physical Therapy Daily Treatment Note  [address] [place] [address]      Patient: El Greenwood   : 1960  Referring practitioner: Jose Ibrahim MD  Date of Initial Visit: Type: THERAPY  Noted: 2023  Today's Date: 2023  Patient seen for 2 sessions         Visit Diagnoses:  No diagnosis found.    Subjective   Patient reports no significant change in the shoulder yet. No problem with the exercises but would like to review them to make sure.    Objective   See Exercise, Manual, and Modality Logs for complete treatment.     Assessment/Plan  Pt tolerated treatment without significant reports of pain except with sidelying ER when the muscle became fatigued so the weight was dropped. Pt given more strengthening exercises for this next week. Continue with current POT progressing pt per tolerance.     Timed:         Manual Therapy:  12       mins  31981;     Therapeutic Exercise:    20     mins  61994;     Neuromuscular Kenneth:      mins  99360;    Therapeutic Activity:          mins  40424;     Gait Training:           mins  10017;     Ultrasound:          mins  47627;    Ionto                                   mins   61754  Self Care                            mins   61392  Canalith Repos         mins 79580      Un-Timed:  Electrical Stimulation:        mins  68481 ( );  Dry Needling          mins self-pay  Traction          mins 68611      Timed Treatment:   32   mins   Total Treatment:     32   mins    Rosanne Huitron, PT  [place] License: [id number]

## 2023-08-03 ENCOUNTER — TREATMENT (OUTPATIENT)
Dept: PHYSICAL THERAPY | Facility: CLINIC | Age: 63
End: 2023-08-03
Payer: COMMERCIAL

## 2023-08-03 DIAGNOSIS — G89.29 CHRONIC PAIN IN RIGHT SHOULDER: Primary | ICD-10-CM

## 2023-08-03 DIAGNOSIS — M25.511 CHRONIC PAIN IN RIGHT SHOULDER: Primary | ICD-10-CM

## 2023-08-10 ENCOUNTER — TREATMENT (OUTPATIENT)
Dept: PHYSICAL THERAPY | Facility: CLINIC | Age: 63
End: 2023-08-10
Payer: COMMERCIAL

## 2023-08-10 DIAGNOSIS — G89.29 CHRONIC PAIN IN RIGHT SHOULDER: Primary | ICD-10-CM

## 2023-08-10 DIAGNOSIS — M25.511 CHRONIC PAIN IN RIGHT SHOULDER: Primary | ICD-10-CM

## 2023-08-11 NOTE — PROGRESS NOTES
Physical Therapy Daily Treatment Note  [address] [place] [address]      Patient: El Greenwood   : 1960  Referring practitioner: Jose Ibrahim MD  Date of Initial Visit: Type: THERAPY  Noted: 2023  Today's Date: 2023  Patient seen for 4 sessions         Visit Diagnoses:    ICD-10-CM ICD-9-CM   1. Chronic pain in right shoulder  M25.511 719.41    G89.29 338.29       Subjective   Patient reports that the shoulder has been doing better this week. Has been able to increase his weight for his exercises but only has a 5lb weight so he did not do many reps.     Objective   See Exercise, Manual, and Modality Logs for complete treatment.     Assessment/Plan  Pt tolerated treatment without report of increased pain. Attempted 2# shoulder flexion but pt had discomfort so weight was decreased to 1# which pt tolerated. Educated pt regarding addition of IR stretch to aid with motion deficits as well as addition of standing weighted AROM to aid with increasing strength.     Timed:         Manual Therapy:   15      mins  18044;     Therapeutic Exercise:   25      mins  58592;     Neuromuscular Kenneth:      mins  66266;    Therapeutic Activity:          mins  11025;     Gait Training:           mins  74315;     Ultrasound:          mins  23133;    Ionto                                   mins   64637  Self Care                            mins   53861  Canalith Repos         mins 69926      Un-Timed:  Electrical Stimulation:        mins  74188 ( );  Dry Needling          mins self-pay  Traction          mins 39259      Timed Treatment:   40   mins   Total Treatment:     40   mins    Rosanne Huitron, PT  [place] License: [id number]

## 2023-08-24 ENCOUNTER — TREATMENT (OUTPATIENT)
Dept: PHYSICAL THERAPY | Facility: CLINIC | Age: 63
End: 2023-08-24
Payer: COMMERCIAL

## 2023-08-24 DIAGNOSIS — M25.511 CHRONIC PAIN IN RIGHT SHOULDER: Primary | ICD-10-CM

## 2023-08-24 DIAGNOSIS — G89.29 CHRONIC PAIN IN RIGHT SHOULDER: Primary | ICD-10-CM

## 2023-09-07 ENCOUNTER — TREATMENT (OUTPATIENT)
Dept: PHYSICAL THERAPY | Facility: CLINIC | Age: 63
End: 2023-09-07
Payer: COMMERCIAL

## 2023-09-07 DIAGNOSIS — M25.511 CHRONIC PAIN IN RIGHT SHOULDER: Primary | ICD-10-CM

## 2023-09-07 DIAGNOSIS — G89.29 CHRONIC PAIN IN RIGHT SHOULDER: Primary | ICD-10-CM

## 2023-09-09 NOTE — PROGRESS NOTES
Physical Therapy Daily Treatment Note  [address] [place] [address]      Patient: El Greenwood   : 1960  Referring practitioner: Jose Ibrahim MD  Date of Initial Visit: Type: THERAPY  Noted: 2023  Today's Date: 2023  Patient seen for 6 sessions         Visit Diagnoses:    ICD-10-CM ICD-9-CM   1. Chronic pain in right shoulder  M25.511 719.41    G89.29 338.29       Subjective   Patient reports that he has noticed if he has his shoulders pulled back he does not get pain in his shoulder if he reaches out for his coffee but if he lets his shoulders round forward he gets pain.    Objective   See Exercise, Manual, and Modality Logs for complete treatment.   Observation: continues with scapular elevation and anterior tilt with shoulder motion 90 deg and above    Assessment/Plan  Pt tolerated treatment but does have difficulty with scapular depression and posterior tilt. Encouraged pt to return to performance of inferior glide at positions of 90 deg and above to aid with HH depression in more challenging ranges. Also discussed with pt practicing in front of the mirror to work on scapular depression with shoulder motion. Continue with current POT progressing pt per tolerance.    Timed:         Manual Therapy: 10        mins  53786;     Therapeutic Exercise:   18      mins  08950;     Neuromuscular Kenneth: 10     mins  14821;    Therapeutic Activity:          mins  95230;     Gait Training:           mins  02457;     Ultrasound:          mins  60068;    Ionto                                   mins   49739  Self Care                            mins   64602  Canalith Repos         mins 02962      Un-Timed:  Electrical Stimulation:        mins  23752 ( );  Dry Needling          mins self-pay  Traction          mins 93423      Timed Treatment:   38   mins   Total Treatment:     38   mins    Rosanne Huitron PT  [place] License: [id number]

## 2023-11-14 ENCOUNTER — OFFICE VISIT (OUTPATIENT)
Dept: GASTROENTEROLOGY | Facility: CLINIC | Age: 63
End: 2023-11-14
Payer: COMMERCIAL

## 2023-11-14 VITALS
WEIGHT: 177 LBS | OXYGEN SATURATION: 96 % | BODY MASS INDEX: 24 KG/M2 | SYSTOLIC BLOOD PRESSURE: 126 MMHG | HEART RATE: 84 BPM | DIASTOLIC BLOOD PRESSURE: 80 MMHG

## 2023-11-14 DIAGNOSIS — Q43.8 REDUNDANT COLON: ICD-10-CM

## 2023-11-14 DIAGNOSIS — R19.5 LOOSE STOOLS: ICD-10-CM

## 2023-11-14 DIAGNOSIS — Z86.010 HISTORY OF ADENOMATOUS POLYP OF COLON: Primary | ICD-10-CM

## 2023-11-14 PROBLEM — Z12.11 COLON CANCER SCREENING: Status: RESOLVED | Noted: 2019-12-30 | Resolved: 2023-11-14

## 2023-11-14 RX ORDER — SODIUM CHLORIDE 9 MG/ML
30 INJECTION, SOLUTION INTRAVENOUS CONTINUOUS PRN
OUTPATIENT
Start: 2023-11-14

## 2023-11-14 RX ORDER — SODIUM, POTASSIUM,MAG SULFATES 17.5-3.13G
SOLUTION, RECONSTITUTED, ORAL ORAL
Qty: 354 ML | Refills: 0 | Status: SHIPPED | OUTPATIENT
Start: 2023-11-14

## 2023-11-14 NOTE — PROGRESS NOTES
New Patient Consult      Date: 2023   Patient Name: El Greenwood  MRN: [id number]  : 1960     Primary Care Provider: Ross Nelson MD  Referring Provider: [place]    Chief Complaint   Patient presents with    Colon Cancer Screening    Diarrhea     History of Present Illness: El Greenwood is a 63 y.o. male who is here today as a consultation with Gastroenterology for evaluation of Colon Cancer Screening and Diarrhea.    He reports long history of looser stools for several months now. He does eat a lot of vegetables because his wife is a vegetarian. Never has constipation. No rectal bleeding. Has generally 1 stool per day. Does have formed stools sometimes, he is not sure if this a change in bowel habits. No current abdominal pain. He has a personal history of colon polyps. Last colonoscopy was in  by Dr. Girard, asked to repeat in 3 years, would like to arrange.     No prior history of any liver disease. No history of alcoholism.     No significant reflux symptoms. No dysphagia. Sometimes has abdominal gas. No nausea or vomiting. Appetite is good. Weight is stable.     Subjective      Past Medical History:   Diagnosis Date    Colon polyp     Visual impairment 2019     Past Surgical History:   Procedure Laterality Date    COLONOSCOPY      COLONOSCOPY N/A 2020    Procedure: COLONOSCOPY;  Surgeon: Pavel Girard MD;  Location: [facility] ENDOSCOPY;  Service: Gastroenterology;  Laterality: N/A;    TONSILLECTOMY      VASECTOMY       Family History   Problem Relation Age of Onset    Cancer Father         multi mylolmona    Kidney disease Father         5yrs dialysis     Social History     Socioeconomic History    Marital status:    Tobacco Use    Smoking status: Former     Packs/day: 0.00     Years: 3.00     Additional pack years: 0.00     Total pack years: 0.00     Types: Cigarettes     Quit date: 1979     Years since quittin.8    Smokeless tobacco: Never    Tobacco  comments:     quit in college   Substance and Sexual Activity    Alcohol use: Yes     Alcohol/week: 4.0 standard drinks of alcohol     Types: 1 Glasses of wine, 2 Cans of beer, 1 Drinks containing 0.5 oz of alcohol per week     Comment: 3-7 per week    Drug use: No    Sexual activity: Yes     Partners: Female     Current Outpatient Medications:     tadalafil (Cialis) 20 MG tablet, Take 1 tablet by mouth Daily As Needed for Erectile Dysfunction., Disp: 6 tablet, Rfl: 2    vitamin D3 125 MCG (5000 UT) capsule capsule, , Disp: , Rfl:     No Known Allergies    The following portions of the patient's history were reviewed and updated as appropriate: allergies, current medications, past family history, past medical history, past social history, past surgical history and problem list.    Objective     Physical Exam  Vitals reviewed.   Constitutional:       General: He is not in acute distress.     Appearance: Normal appearance. He is well-developed. He is not ill-appearing or diaphoretic.   HENT:      Head: Normocephalic and atraumatic.      Right Ear: External ear normal.      Left Ear: External ear normal.      Nose: Nose normal.   Eyes:      General: No scleral icterus.        Right eye: No discharge.         Left eye: No discharge.      Conjunctiva/sclera: Conjunctivae normal.   Neck:      Vascular: No JVD.   Cardiovascular:      Rate and Rhythm: Normal rate and regular rhythm.      Heart sounds: Normal heart sounds. No murmur heard.     No friction rub. No gallop.   Pulmonary:      Effort: Pulmonary effort is normal. No respiratory distress.      Breath sounds: Normal breath sounds. No wheezing or rales.   Chest:      Chest wall: No tenderness.   Abdominal:      General: Abdomen is flat. Bowel sounds are normal. There is no distension.      Palpations: There is no mass.      Tenderness: There is no abdominal tenderness. There is no guarding.   Musculoskeletal:         General: No deformity. Normal range of motion.       Cervical back: Normal range of motion.   Skin:     General: Skin is warm and dry.      Findings: No erythema or rash.   Neurological:      Mental Status: He is alert and oriented to person, place, and time.      Coordination: Coordination normal.   Psychiatric:         Mood and Affect: Mood normal.         Behavior: Behavior normal.         Thought Content: Thought content normal.         Judgment: Judgment normal.         Vitals:    11/14/23 1437   BP: 126/80   Pulse: 84   SpO2: 96%   Weight: 80.3 kg (177 lb)     Results Review:   I have reviewed the patient's new clinical and imaging results.    Office Visit on 07/18/2023   Component Date Value Ref Range Status    WBC 07/26/2023 6.34  3.40 - 10.80 10*3/mm3 Final    RBC 07/26/2023 5.12  4.14 - 5.80 10*6/mm3 Final    Hemoglobin 07/26/2023 15.3  13.0 - 17.7 g/dL Final    Hematocrit 07/26/2023 45.7  37.5 - 51.0 % Final    MCV 07/26/2023 89.3  79.0 - 97.0 fL Final    MCH 07/26/2023 29.9  26.6 - 33.0 pg Final    MCHC 07/26/2023 33.5  31.5 - 35.7 g/dL Final    RDW 07/26/2023 12.3  12.3 - 15.4 % Final    Platelets 07/26/2023 204  140 - 450 10*3/mm3 Final    Neutrophil Rel % 07/26/2023 47.6  42.7 - 76.0 % Final    Lymphocyte Rel % 07/26/2023 38.6  19.6 - 45.3 % Final    Monocyte Rel % 07/26/2023 9.9  5.0 - 12.0 % Final    Eosinophil Rel % 07/26/2023 2.4  0.3 - 6.2 % Final    Basophil Rel % 07/26/2023 0.9  0.0 - 1.5 % Final    Neutrophils Absolute 07/26/2023 3.01  1.70 - 7.00 10*3/mm3 Final    Lymphocytes Absolute 07/26/2023 2.45  0.70 - 3.10 10*3/mm3 Final    Monocytes Absolute 07/26/2023 0.63  0.10 - 0.90 10*3/mm3 Final    Eosinophils Absolute 07/26/2023 0.15  0.00 - 0.40 10*3/mm3 Final    Basophils Absolute 07/26/2023 0.06  0.00 - 0.20 10*3/mm3 Final    Immature Granulocyte Rel % 07/26/2023 0.6 (H)  0.0 - 0.5 % Final    Immature Grans Absolute 07/26/2023 0.04  0.00 - 0.05 10*3/mm3 Final    nRBC 07/26/2023 0.0  0.0 - 0.2 /100 WBC Final    Glucose 07/26/2023 96  65 - 99  mg/dL Final    BUN 07/26/2023 17  8 - 23 mg/dL Final    Creatinine 07/26/2023 1.06  0.76 - 1.27 mg/dL Final    EGFR Result 07/26/2023 78.4  >60.0 mL/min/1.73 Final    Comment: GFR Normal >60  Chronic Kidney Disease <60  Kidney Failure <15      BUN/Creatinine Ratio 07/26/2023 16.0  7.0 - 25.0 Final    Sodium 07/26/2023 140  136 - 145 mmol/L Final    Potassium 07/26/2023 4.4  3.5 - 5.2 mmol/L Final    Chloride 07/26/2023 103  98 - 107 mmol/L Final    Total CO2 07/26/2023 27.6  22.0 - 29.0 mmol/L Final    Calcium 07/26/2023 9.4  8.6 - 10.5 mg/dL Final    Total Protein 07/26/2023 6.3  6.0 - 8.5 g/dL Final    Albumin 07/26/2023 4.3  3.5 - 5.2 g/dL Final    Globulin 07/26/2023 2.0  gm/dL Final    A/G Ratio 07/26/2023 2.2  g/dL Final    Total Bilirubin 07/26/2023 0.7  0.0 - 1.2 mg/dL Final    Alkaline Phosphatase 07/26/2023 86  39 - 117 U/L Final    AST (SGOT) 07/26/2023 20  1 - 40 U/L Final    ALT (SGPT) 07/26/2023 21  1 - 41 U/L Final    TSH 07/26/2023 1.550  0.270 - 4.200 uIU/mL Final    Total Cholesterol 07/26/2023 196  0 - 200 mg/dL Final    Triglycerides 07/26/2023 98  0 - 150 mg/dL Final    HDL Cholesterol 07/26/2023 46  40 - 60 mg/dL Final    VLDL Cholesterol Johnnie 07/26/2023 18  5 - 40 mg/dL Final    LDL Chol Calc (NIH) 07/26/2023 132 (H)  0 - 100 mg/dL Final    PSA 07/26/2023 0.641  0.000 - 4.000 ng/mL Final    Comment: Results may be falsely decreased if patient taking Biotin.  Testing Method: Roche [employer]ostics Electrochemiluminescence  Immunoassay(ECLIA)  Values obtained with different assay methods or kits cannot  be used interchangeably.      Endomysial IgA 07/26/2023 Negative  Negative Final    Tissue Transglutaminase IgA 07/26/2023 <2  0 - 3 U/mL Final    Comment:                               Negative        0 -  3                                Weak Positive   4 - 10                                Positive           >10   Tissue Transglutaminase (tTG) has been identified   as the endomysial antigen.   Studies have demonstr-   ated that endomysial IgA antibodies have over 99%   specificity for gluten sensitive enteropathy.      IgA 07/26/2023 288  61 - 437 mg/dL Final    Saccharomyces cerevisiae Ab IgG 07/26/2023 <20.0  0.0 - 24.9 Units Final    Comment:                                 Negative         <20.0                                  Equivocal  20.1 - 24.9                                  Positive     >or= 25.0      Saccharomyces cerevisiae Ab IgA 07/26/2023 <20.0  0.0 - 24.9 Units Final    Atypical pANCA 07/26/2023 <1:20  Neg:<1:20 titer Final    Comment: The atypical pANCA pattern has been observed in a significant  percentage of patients with ulcerative colitis, primary sclerosing  cholangitis and autoimmune hepatitis.            ASCA+/PANCA- Suggestive of Crohn's disease            ASCA-/PANCA+ Suggestive of Ulcerative colitis      25 Hydroxy, Vitamin D 07/26/2023 46.7  30.0 - 100.0 ng/ml Final    Testosterone, Total 07/26/2023 449  264 - 916 ng/dL Final    Comment: Adult male reference interval is based on a population of  healthy nonobese males (BMI <30) between 19 and 39 years old.  Cheri et.al. JCEM 2017,102;5652-7483. PMID: 60426916.      Testosterone, Free 07/26/2023 6.6  6.6 - 18.1 pg/mL Final    Prolactin 07/26/2023 17.4 (H)  4.0 - 15.2 ng/mL Final      No recent abdominal imaging to review.     Colonoscopy 2/2020 by Dr. Girard  DIAGNOSES:    Left-sided diverticulosis.  Colon polyps.  5 were removed.  3-5 mm in size.  Internal hemorrhoids.  The colon was noted to be tortuous, redundant and spastic.  Multiple loops were reduced.  Prep score 7  Pathology showed sessile serrated adenoma of cecum, hyperplastic rectal polyp, sessile serrated adenoma ascending colon.     Assessment / Plan      1. History of adenomatous polyp of colon  2. Redundant colon  3. Loose stools  His last colonoscopy was in 2/2020, he had serrated adenomas on pathology. He was directed to repeat in 3 years, due now. He has  noticed some loose stools over the years, no fecal urgency or increased frequency. Had redundant colon noted on last colonoscopy report. Recent PCP labs 7/2023 negative for celiac, normal basic labs, normal TSH.    High fiber diet  Colonoscopy will be arranged    He will have a colonoscopy performed with monitored anesthesia care. The indications, technique, alternatives and potential risk and complications were discussed with the patient including but not limited to bleeding, bowel perforations, missing lesions and anesthetic complications. The patient understands and wishes to proceed with the procedure and has given their verbal consent. Written patient education information was given to the patient. He should follow up in the office after this procedure to discuss the results and further recommendations can be made at that time. The patient will call if they have further questions before procedure.  - Case Request  - sodium-potassium-magnesium sulfates (SUPREP) 17.5-3.13-1.6 GM/177ML solution oral solution; Take 2 bottles by mouth 1 (One) Time for 1 dose. Please see prep instructions from office  Dispense: 354 mL; Refill: 0    Follow Up:   Return for follow up after procedure to discuss results if needed.    Charlotte Mullins PA-C  [facility]  11/14/2023  15:06 EST    Dictated Utilizing Dragon Dictation: Part of this note may be an electronic transcription/translation of spoken language to printed text using the Dragon Dictation System.

## 2023-11-17 ENCOUNTER — PATIENT ROUNDING (BHMG ONLY) (OUTPATIENT)
Dept: GASTROENTEROLOGY | Facility: CLINIC | Age: 63
End: 2023-11-17
Payer: COMMERCIAL

## 2023-11-17 PROBLEM — Z86.0101 HISTORY OF ADENOMATOUS POLYP OF COLON: Status: ACTIVE | Noted: 2023-11-14

## 2023-11-17 PROBLEM — Z86.010 HISTORY OF ADENOMATOUS POLYP OF COLON: Status: ACTIVE | Noted: 2023-11-14

## 2023-11-17 NOTE — PROGRESS NOTES
November 17, 2023    Hello, may I speak with El Greenwood?    My name is Fe     I am  with [facility]  [address]  [place] [address] [phone].    Before we get started may I verify your date of birth? 1960    I am calling to officially welcome you to our practice and ask about your recent visit. Is this a good time to talk? yes    Tell me about your visit with us. What things went well?  the visit went good. Good Job overall       We're always looking for ways to make our patients' experiences even better. Do you have recommendations on ways we may improve?  no    Overall were you satisfied with your first visit to our practice? yes       I appreciate you taking the time to speak with me today. Is there anything else I can do for you? no      Thank you, and have a great day.       Yes

## 2023-12-29 ENCOUNTER — TELEPHONE (OUTPATIENT)
Dept: GASTROENTEROLOGY | Facility: CLINIC | Age: 63
End: 2023-12-29
Payer: COMMERCIAL

## 2023-12-29 NOTE — TELEPHONE ENCOUNTER
Spoke to patient regarding insurance - he will have [facility] PPO on 1/1/24 - I asked him to call insurance to check for benefits.  I also asked him to bring new insurance card to office 1/2/24 so we can scan and update chart for prior authorization. He understands.

## 2023-12-29 NOTE — TELEPHONE ENCOUNTER
----- Message from Clarissa Villafuerte MA sent at 12/29/2023  1:45 PM EST -----  Regarding: FW: New Insurance    ----- Message -----  From: Tram Zamora RN  Sent: 12/29/2023   1:42 PM EST  To: Opal Garza [facility]  Subject: New Insurance                                    This patient is having a colonoscopy on 1/10/24 with Dr. Mar and he states he will be switching to medicare on 1/1/24. He just wanted to make you guys aware so that they could bill accordingly. If you have any questions just give him a call.      Thanks!

## 2024-01-10 ENCOUNTER — ANESTHESIA EVENT (OUTPATIENT)
Dept: GASTROENTEROLOGY | Facility: HOSPITAL | Age: 64
End: 2024-01-10
Payer: MEDICARE

## 2024-01-10 ENCOUNTER — ANESTHESIA (OUTPATIENT)
Dept: GASTROENTEROLOGY | Facility: HOSPITAL | Age: 64
End: 2024-01-10
Payer: MEDICARE

## 2024-01-10 ENCOUNTER — HOSPITAL ENCOUNTER (OUTPATIENT)
Facility: HOSPITAL | Age: 64
Setting detail: HOSPITAL OUTPATIENT SURGERY
Discharge: HOME | End: 2024-01-10
Attending: INTERNAL MEDICINE | Admitting: INTERNAL MEDICINE
Payer: MEDICARE

## 2024-01-10 VITALS
BODY MASS INDEX: 23.7 KG/M2 | TEMPERATURE: 98.1 F | SYSTOLIC BLOOD PRESSURE: 121 MMHG | WEIGHT: 175 LBS | OXYGEN SATURATION: 96 % | HEIGHT: 72 IN | HEART RATE: 70 BPM | DIASTOLIC BLOOD PRESSURE: 73 MMHG | RESPIRATION RATE: 16 BRPM

## 2024-01-10 DIAGNOSIS — R19.4 CHANGE IN BOWEL HABITS: ICD-10-CM

## 2024-01-10 DIAGNOSIS — Z86.010 HISTORY OF ADENOMATOUS POLYP OF COLON: ICD-10-CM

## 2024-01-10 PROCEDURE — 25810000003 SODIUM CHLORIDE 0.9 % SOLUTION: Performed by: PHYSICIAN ASSISTANT

## 2024-01-10 PROCEDURE — 45380 COLONOSCOPY AND BIOPSY: CPT | Performed by: INTERNAL MEDICINE

## 2024-01-10 PROCEDURE — 45385 COLONOSCOPY W/LESION REMOVAL: CPT | Performed by: INTERNAL MEDICINE

## 2024-01-10 PROCEDURE — 25010000002 PROPOFOL 200 MG/20ML EMULSION: Performed by: NURSE ANESTHETIST, CERTIFIED REGISTERED

## 2024-01-10 RX ORDER — LIDOCAINE HCL/PF 100 MG/5ML
SYRINGE (ML) INJECTION AS NEEDED
Status: DISCONTINUED | OUTPATIENT
Start: 2024-01-10 | End: 2024-01-10 | Stop reason: SURG

## 2024-01-10 RX ORDER — SODIUM CHLORIDE 9 MG/ML
30 INJECTION, SOLUTION INTRAVENOUS CONTINUOUS PRN
Status: DISCONTINUED | OUTPATIENT
Start: 2024-01-10 | End: 2024-01-10 | Stop reason: HOSPADM

## 2024-01-10 RX ORDER — SIMETHICONE 20 MG/.3ML
EMULSION ORAL AS NEEDED
Status: DISCONTINUED | OUTPATIENT
Start: 2024-01-10 | End: 2024-01-10 | Stop reason: HOSPADM

## 2024-01-10 RX ORDER — PROPOFOL 10 MG/ML
INJECTION, EMULSION INTRAVENOUS AS NEEDED
Status: DISCONTINUED | OUTPATIENT
Start: 2024-01-10 | End: 2024-01-10 | Stop reason: SURG

## 2024-01-10 RX ADMIN — PROPOFOL 100 MG: 10 INJECTION, EMULSION INTRAVENOUS at 10:10

## 2024-01-10 RX ADMIN — PROPOFOL 100 MG: 10 INJECTION, EMULSION INTRAVENOUS at 09:49

## 2024-01-10 RX ADMIN — SODIUM CHLORIDE 30 ML/HR: 9 INJECTION, SOLUTION INTRAVENOUS at 09:25

## 2024-01-10 RX ADMIN — PROPOFOL 100 MG: 10 INJECTION, EMULSION INTRAVENOUS at 09:54

## 2024-01-10 RX ADMIN — PROPOFOL 100 MG: 10 INJECTION, EMULSION INTRAVENOUS at 10:03

## 2024-01-10 RX ADMIN — Medication 60 MG: at 09:49

## 2024-01-10 NOTE — DISCHARGE INSTRUCTIONS
See procedure report for DC recommendation    No pushing, pulling, tugging,  heavy lifting, or strenuous activity.  No major decision making, driving, or drinking alcoholic beverages for 24 hours. ( due to the medications you have  received)  Always use good hand hygiene/washing techniques.  NO driving while taking pain medications.    * if you have an incision:  Check your incision area every day for signs of infection.   Check for:  * more redness, swelling, or pain  *more fluid or blood  *warmth  *pus or bad smellTo assist you in voiding:  Drink plenty of fluids  Listen to running water while attempting to void.    If you are unable to urinate and you have an uncomfortable urge to void or it has been   6 hours since you were discharged, return to the Emergency Room

## 2024-01-10 NOTE — ANESTHESIA PREPROCEDURE EVALUATION
Anesthesia Evaluation     Patient summary reviewed and Nursing notes reviewed   NPO Solid Status: > 8 hours  NPO Liquid Status: > 8 hours           Airway   Mallampati: II  TM distance: >3 FB  Neck ROM: full  Possible difficult intubation  Dental - normal exam     Pulmonary - normal exam   (+) a smoker Former, Abstained day of surgery, cigarettes,  Cardiovascular - normal exam        Neuro/Psych  GI/Hepatic/Renal/Endo      Musculoskeletal     Abdominal  - normal exam   Substance History   (+) alcohol use     OB/GYN          Other                    Anesthesia Plan    ASA 2     MAC     intravenous induction     Anesthetic plan, risks, benefits, and alternatives have been provided, discussed and informed consent has been obtained with: patient.  Pre-procedure education provided  Plan discussed with CRNA.    CODE STATUS:

## 2024-01-10 NOTE — H&P
[facility]  HISTORY AND PHYSICAL    Patient Name: El Greenwood  : 1960  MRN: [id number]    Chief Complaint:   For surveillance colonoscopy    History Of Presenting Illness:    H/o Colon polyps 2020  Change in bowel habit    Past Medical History:   Diagnosis Date    Colon polyp     Visual impairment 2019       Past Surgical History:   Procedure Laterality Date    COLONOSCOPY      COLONOSCOPY N/A 2020    Procedure: COLONOSCOPY;  Surgeon: Pavel Girard MD;  Location: [facility] ENDOSCOPY;  Service: Gastroenterology;  Laterality: N/A;    TONSILLECTOMY      VASECTOMY         Social History     Socioeconomic History    Marital status:    Tobacco Use    Smoking status: Former     Packs/day: 0.00     Years: 3.00     Additional pack years: 0.00     Total pack years: 0.00     Types: Cigarettes     Quit date: 1979     Years since quittin.0    Smokeless tobacco: Never    Tobacco comments:     quit in college   Substance and Sexual Activity    Alcohol use: Yes     Alcohol/week: 4.0 standard drinks of alcohol     Types: 1 Glasses of wine, 2 Cans of beer, 1 Drinks containing 0.5 oz of alcohol per week     Comment: 3-7 per week    Drug use: No    Sexual activity: Yes     Partners: Female       Family History   Problem Relation Age of Onset    Heart disease Mother     Cancer Father         multi mylolmona    Kidney disease Father         5yrs dialysis    No Known Problems Sister     No Known Problems Brother        Prior to Admission Medications:  Medications Prior to Admission   Medication Sig Dispense Refill Last Dose    sodium-potassium-magnesium sulfates (SUPREP) 17.5-3.13-1.6 GM/177ML solution oral solution Take 2 bottles by mouth 1 (One) Time for 1 dose. Please see prep instructions from office 354 mL 0 1/10/2024    vitamin D3 125 MCG (5000 UT) capsule capsule    Past Month    tadalafil (Cialis) 20 MG tablet Take 1 tablet by mouth Daily As Needed for Erectile Dysfunction.  6 tablet 2 More than a month       Allergies:  No Known Allergies     Vitals: Temp:  [97.9 °F (36.6 °C)] 97.9 °F (36.6 °C)  Heart Rate:  [86] 86  Resp:  [14] 14  BP: (146)/(92) 146/92    Review Of Systems:  Constitutional:  Negative for chills, fever, and unexpected weight change.  Respiratory:  Negative for cough, chest tightness, shortness of breath, and wheezing.  Cardiovascular:  Negative for chest pain, palpitations, and leg swelling.  Gastrointestinal:  Negative for abdominal distention, abdominal pain, nausea, vomiting.  Neurological:  Negative for weakness, numbness, and headaches.     Physical Exam:    General Appearance:  Alert, cooperative, in no acute distress.   Lungs:   Clear to auscultation, respirations regular, even and                 unlabored.   Heart:  Regular rhythm and normal rate.   Abdomen:   Normal bowel sounds, no masses, no organomegaly. Soft, nontender, nondistended   Neurologic: Alert and oriented x 3. Moves all four limbs equally       Assessment & Plan     Assessment:  Principal Problem:    History of adenomatous polyp of colon      Plan: COLONOSCOPY FOR SCREENING CPT CODE:  (N/A)     Ashley Mar MD  1/10/2024      [facility]  HISTORY AND PHYSICAL    Patient Name: El Greenwood  : 1960  MRN: [id number]    Chief Complaint:   For screening/surveillance colonoscopy    History Of Presenting Illness:        Past Medical History:   Diagnosis Date    Colon polyp     Visual impairment 2019       Past Surgical History:   Procedure Laterality Date    COLONOSCOPY      COLONOSCOPY N/A 2020    Procedure: COLONOSCOPY;  Surgeon: Pavel Girard MD;  Location: [facility] ENDOSCOPY;  Service: Gastroenterology;  Laterality: N/A;    TONSILLECTOMY      VASECTOMY         Social History     Socioeconomic History    Marital status:    Tobacco Use    Smoking status: Former     Packs/day: 0.00     Years: 3.00     Additional pack years: 0.00     Total pack  years: 0.00     Types: Cigarettes     Quit date: 1979     Years since quittin.0    Smokeless tobacco: Never    Tobacco comments:     quit in college   Substance and Sexual Activity    Alcohol use: Yes     Alcohol/week: 4.0 standard drinks of alcohol     Types: 1 Glasses of wine, 2 Cans of beer, 1 Drinks containing 0.5 oz of alcohol per week     Comment: 3-7 per week    Drug use: No    Sexual activity: Yes     Partners: Female       Family History   Problem Relation Age of Onset    Heart disease Mother     Cancer Father         multi mylolmona    Kidney disease Father         5yrs dialysis    No Known Problems Sister     No Known Problems Brother        Prior to Admission Medications:  Medications Prior to Admission   Medication Sig Dispense Refill Last Dose    sodium-potassium-magnesium sulfates (SUPREP) 17.5-3.13-1.6 GM/177ML solution oral solution Take 2 bottles by mouth 1 (One) Time for 1 dose. Please see prep instructions from office 354 mL 0 1/10/2024    vitamin D3 125 MCG (5000 UT) capsule capsule    Past Month    tadalafil (Cialis) 20 MG tablet Take 1 tablet by mouth Daily As Needed for Erectile Dysfunction. 6 tablet 2 More than a month       Allergies:  No Known Allergies     Vitals: Temp:  [97.9 °F (36.6 °C)] 97.9 °F (36.6 °C)  Heart Rate:  [86] 86  Resp:  [14] 14  BP: (146)/(92) 146/92    Review Of Systems:  Constitutional:  Negative for chills, fever, and unexpected weight change.  Respiratory:  Negative for cough, chest tightness, shortness of breath, and wheezing.  Cardiovascular:  Negative for chest pain, palpitations, and leg swelling.  Gastrointestinal:  Negative for abdominal distention, abdominal pain, nausea, vomiting.  Neurological:  Negative for weakness, numbness, and headaches.     Physical Exam:    General Appearance:  Alert, cooperative, in no acute distress.   Lungs:   Clear to auscultation, respirations regular, even and                 unlabored.   Heart:  Regular rhythm and  normal rate.   Abdomen:   Normal bowel sounds, no masses, no organomegaly. Soft, nontender, nondistended   Neurologic: Alert and oriented x 3. Moves all four limbs equally       Assessment & Plan     Assessment:  Principal Problem:    History of adenomatous polyp of colon      Plan: COLONOSCOPY FOR SCREENING CPT CODE:  (N/A)     Ashley Mar MD  1/10/2024

## 2024-01-11 LAB — REF LAB TEST METHOD: NORMAL

## 2024-03-12 ENCOUNTER — OFFICE VISIT (OUTPATIENT)
Dept: GASTROENTEROLOGY | Facility: CLINIC | Age: 64
End: 2024-03-12
Payer: MEDICARE

## 2024-03-12 VITALS
DIASTOLIC BLOOD PRESSURE: 80 MMHG | HEART RATE: 83 BPM | WEIGHT: 183 LBS | SYSTOLIC BLOOD PRESSURE: 130 MMHG | BODY MASS INDEX: 24.82 KG/M2 | OXYGEN SATURATION: 95 %

## 2024-03-12 DIAGNOSIS — K57.30 DIVERTICULOSIS OF COLON: ICD-10-CM

## 2024-03-12 DIAGNOSIS — D12.6 SERRATED ADENOMA OF COLON: Primary | ICD-10-CM

## 2024-03-12 PROCEDURE — 99214 OFFICE O/P EST MOD 30 MIN: CPT | Performed by: PHYSICIAN ASSISTANT

## 2024-03-12 PROCEDURE — 1159F MED LIST DOCD IN RCRD: CPT | Performed by: PHYSICIAN ASSISTANT

## 2024-03-12 PROCEDURE — 1160F RVW MEDS BY RX/DR IN RCRD: CPT | Performed by: PHYSICIAN ASSISTANT

## 2024-03-12 NOTE — PATIENT INSTRUCTIONS
Fiber Foods  It is recommended that you consume 25-45 grams daily.    Fresh & Dried Fruit  Serving Size Fiber (g)    Apples with skin  1 medium 5.0    Apricot  3 medium 1.0    Apricots, dried  4 pieces 2.9    Banana  1 medium 3.9    Blueberries  1 cup 4.2    Cantaloupe, cubes  1 cup 1.3    Figs, dried  2 medium 3.7    Grapefruit  1/2 medium 3.1    Orange, navel  1 medium 3.4    Peach  1 medium 2.0    Peaches, dried  3 pieces 3.2    Pear  1 medium 5.1    Plum  1 medium 1.1    Raisins  1.5 oz box 1.6    Raspberries  1 cup 6.4    Strawberries  1 cup 4.4      Grains, Beans, Nuts & Seeds  Serving Size Fiber (g)    Almonds  1 oz 4.2    Black beans, cooked  1 cup 13.9    Bran cereal  1 cup 19.9    Bread, whole wheat  1 slice 2.0    Brown rice, dry  1 cup 7.9    Cashews  1 oz 1.0    Flax seeds  3 Tbsp. 6.9    Garbanzo beans, cooked  1 cup 5.8    Kidney beans, cooked  1 cup 11.6    Lentils, red cooked  1 cup 13.6    Lima beans, cooked  1 cup 8.6    Oats, rolled dry  1 cup 12.0    Quinoa (seeds) dry  1/4 cup 6.2    Quinoa, cooked  1 cup 8.4    Pasta, whole wheat  1 cup 6.3    Peanuts  1 oz 2.3    Pistachio nuts  1 oz 3.1    Pumpkin seeds  1/4 cup 4.1    Soybeans, cooked  1 cup 8.6    Sunflower seeds  1/4 cup 3.0    Walnuts  1 oz 3.1            Vegetables  Serving Size Fiber (g)    Avocado (fruit)  1 medium 11.8    Beets, cooked  1 cup 2.8    Beet greens  1 cup 4.2    Bok ayush, cooked  1 cup 2.8    Broccoli, cooked  1 cup 4.5    [place] sprouts, cooked  1 cup 3.6    Cabbage, cooked  1 cup 4.2    Carrot  1 medium 2.6    Carrot, cooked  1 cup 5.2    Cauliflower, cooked  1 cup 3.4    Han slaw  1 cup 4.0    Ashlyn greens, cooked  1 cup 2.6    Corn, sweet  1 cup 4.6    Green beans  1 cup 4.0    Celery  1 stalk 1.1    Kale, cooked  1 cup 7.2    Onions, raw  1 cup 2.9    Peas, cooked  1 cup 8.8    Peppers, sweet  1 cup 2.6    Pop corn, air-popped  3 cups 3.6    Potato, baked w/ skin  1 medium 4.8    Spinach, cooked  1 cup 4.3     Summer squash, cooked  1 cup 2.5    Sweet potato, cooked  1 medium 4.9    Swiss chard, cooked  1 cup 3.7    Tomato  1 medium 1.0    Winter squash, cooked  1 cup 6.2    Zucchini, cooked  1 cup 2.6

## 2024-03-12 NOTE — PROGRESS NOTES
Follow Up Note     Date: 2024   Patient Name: El Greenwood  MRN: [id number]  : 1960     Primary Care Provider: Ross Nelson MD     Chief Complaint   Patient presents with    Results     Colonoscopy     History of present illness:   3/12/2024  El Greenwood is a 64 y.o. male who is here today for follow up regarding Results (Colonoscopy).    He had colonoscopy completed since last visit, would like to discuss those results.  He still has loose stool 1-2 times daily.  No significant fecal urgency.  Does sometimes have increased flatulence.  No complaints of bloating.  No nausea or vomiting.  No abdominal pain.      Interval History:    He reports long history of looser stools for several months now. He does eat a lot of vegetables because his wife is a vegetarian. Never has constipation. No rectal bleeding. Has generally 1 stool per day. Does have formed stools sometimes, he is not sure if this a change in bowel habits. No current abdominal pain. He has a personal history of colon polyps. Last colonoscopy was in  by Dr. Girard, asked to repeat in 3 years, would like to arrange.      No prior history of any liver disease. No history of alcoholism.      No significant reflux symptoms. No dysphagia. Sometimes has abdominal gas. No nausea or vomiting. Appetite is good. Weight is stable.     Subjective     Past Medical History:   Diagnosis Date    Colon polyp     Visual impairment 2019     Past Surgical History:   Procedure Laterality Date    COLONOSCOPY      COLONOSCOPY N/A 2020    Procedure: COLONOSCOPY;  Surgeon: Pavel Girard MD;  Location: [facility] ENDOSCOPY;  Service: Gastroenterology;  Laterality: N/A;    COLONOSCOPY N/A 1/10/2024    Procedure: COLONOSCOPY with biopsy and polypectomy;  Surgeon: Ashley Mar MD;  Location: [facility] ENDOSCOPY;  Service: Gastroenterology;  Laterality: N/A;    TONSILLECTOMY      VASECTOMY       Family History   Problem Relation  Age of Onset    Heart disease Mother     Cancer Father         multi mylolmona    Kidney disease Father         5yrs dialysis    No Known Problems Sister     No Known Problems Brother      Social History     Socioeconomic History    Marital status:    Tobacco Use    Smoking status: Former     Current packs/day: 0.00     Types: Cigarettes     Quit date: 1976     Years since quittin.2    Smokeless tobacco: Never    Tobacco comments:     quit in college   Substance and Sexual Activity    Alcohol use: Yes     Alcohol/week: 4.0 standard drinks of alcohol     Types: 1 Glasses of wine, 2 Cans of beer, 1 Drinks containing 0.5 oz of alcohol per week     Comment: 3-7 per week    Drug use: No    Sexual activity: Yes     Partners: Female     Current Outpatient Medications:     tadalafil (Cialis) 20 MG tablet, Take 1 tablet by mouth Daily As Needed for Erectile Dysfunction., Disp: 6 tablet, Rfl: 2    vitamin D3 125 MCG (5000 UT) capsule capsule, , Disp: , Rfl:     No Known Allergies    The following portions of the patient's history were reviewed and updated as appropriate: allergies, current medications, past family history, past medical history, past social history, past surgical history and problem list.    Objective     Physical Exam  Constitutional:       General: He is not in acute distress.     Appearance: Normal appearance. He is well-developed. He is not diaphoretic.   HENT:      Head: Normocephalic and atraumatic.      Right Ear: External ear normal.      Left Ear: External ear normal.      Nose: Nose normal.   Eyes:      General: No scleral icterus.        Right eye: No discharge.         Left eye: No discharge.      Conjunctiva/sclera: Conjunctivae normal.   Neck:      Trachea: No tracheal deviation.   Pulmonary:      Effort: Pulmonary effort is normal. No respiratory distress.   Musculoskeletal:         General: Normal range of motion.      Cervical back: Normal range of motion.   Skin:      Coloration: Skin is not pale.      Findings: No erythema or rash.   Neurological:      Mental Status: He is alert and oriented to person, place, and time.      Coordination: Coordination normal.   Psychiatric:         Mood and Affect: Mood normal.         Behavior: Behavior normal.         Thought Content: Thought content normal.         Judgment: Judgment normal.       Vitals:    03/12/24 0932   BP: 130/80   Pulse: 83   SpO2: 95%   Weight: 83 kg (183 lb)     Results Review:   I reviewed the patient's new clinical results.    Colonoscopy by Dr. Mar 1/10/2024  - Preparation of the colon was fair. - Hemorrhoids found on perianal exam. - One 4 mm polyp in the cecum, removed with a cold snare. Resected and retrieved. - One 2 mm polyp in the cecum, removed with a cold snare. Resected and retrieved. - One 2 to 3 mm polyp in the proximal ascending colon, removed with a cold snare. Resected and retrieved. - One 5-6 mm polyp at the hepatic flexure, removed with a cold snare. Resected and retrieved. - One 4 mm polyp at the hepatic flexure, removed with a cold snare. Resected and retrieved. - Diverticulosis in the sigmoid colon and in the descending colon. - Non-bleeding internal hemorrhoids. - The examined portion of the ileum was normal. Biopsied. - Biopsies were taken with a cold forceps from the right colon, left colon and transverse colon for evaluation of microscopic colitis.   Pathology DIAGNOSIS:  A.     TERMINAL ILEUM BIOPSY:  Small bowel mucosa with no significant pathologic change  B.     CECUM POLYP, X2:  Sessile serrated adenomas  Negative for cytologic atypia or malignancy  C.     ASCENDING COLON POLYP:  Colonic mucosa with lymphoid aggregate  Negative for dysplasia or malignancy  D.     RANDOM COLON BIOPSIES, ASCENDING DESCENDING AND TRANSVERSE:  Colonic mucosa with no significant pathologic change  E.     HEPATIC FLEXURE POLYP, X2:  Tubular adenoma fragment without high-grade dysplasia  Separate fragments  of sessile serrated adenomas without cytologic atypia  Negative for malignancy  Cannot rule out advanced sessile serrated adenoma.  Clinical  correlation suggested.     Assessment / Plan      1. Serrated adenoma of colon  2. Diverticulosis of colon  Colonoscopy 1-2024 showed 5 small colon polyps.  Preparation of the colon was fair.  On pathology, polyps were sessile serrated adenomas x 3 and one tubular adenoma.  Other colon polyp was colonic mucosa with lymphoid aggregate.  Diverticulosis of the left colon was noted.    Repeat colonoscopy for surveillance in 3 years, due 1/2027  Recommend high-fiber diet  Consider Metamucil daily with gradual increase to recommended dose on bottle  Call with concerns or worsening of complaint    Follow Up:   Return if symptoms worsen or fail to improve.  He prefers call back to arrange appointment if needed.    Charlotte Mullins PA-C  [facility] [place]  3/12/2024  16:51 EDT    Dictated Utilizing Dragon Dictation: Part of this note may be an electronic transcription/translation of spoken language to printed text using the Dragon Dictation System.

## 2024-10-31 ENCOUNTER — OFFICE VISIT (OUTPATIENT)
Dept: INTERNAL MEDICINE | Facility: CLINIC | Age: 64
End: 2024-10-31
Payer: MEDICARE

## 2024-10-31 VITALS
BODY MASS INDEX: 24.24 KG/M2 | HEIGHT: 72 IN | OXYGEN SATURATION: 94 % | HEART RATE: 81 BPM | RESPIRATION RATE: 16 BRPM | DIASTOLIC BLOOD PRESSURE: 75 MMHG | SYSTOLIC BLOOD PRESSURE: 119 MMHG | TEMPERATURE: 98.4 F | WEIGHT: 179 LBS

## 2024-10-31 DIAGNOSIS — Z23 NEEDS FLU SHOT: Primary | ICD-10-CM

## 2024-10-31 DIAGNOSIS — N52.01 ERECTILE DYSFUNCTION DUE TO ARTERIAL INSUFFICIENCY: ICD-10-CM

## 2024-10-31 PROBLEM — R03.0 ELEVATED BP WITHOUT DIAGNOSIS OF HYPERTENSION: Status: RESOLVED | Noted: 2023-07-18 | Resolved: 2024-10-31

## 2024-10-31 PROBLEM — R19.5 LOOSE STOOLS: Status: RESOLVED | Noted: 2023-07-18 | Resolved: 2024-10-31

## 2024-10-31 NOTE — PROGRESS NOTES
The ABCs of the Annual Wellness Visit  Subsequent Medicare Wellness Visit    Subjective      El Greenwood is a 64 y.o. male who presents for a Subsequent Medicare Wellness Visit.    Review of Systems   Constitutional: Negative.  Negative for activity change, appetite change, fatigue and fever.   HENT:  Negative for congestion, ear discharge, ear pain and trouble swallowing.    Eyes:  Negative for photophobia and visual disturbance.   Respiratory:  Negative for cough and shortness of breath.    Cardiovascular:  Negative for chest pain and palpitations.   Gastrointestinal:  Negative for abdominal distention, abdominal pain, constipation, diarrhea, nausea and vomiting.   Endocrine: Negative.    Genitourinary:  Negative for dysuria, hematuria and urgency.   Musculoskeletal:  Positive for arthralgias. Negative for back pain, joint swelling and myalgias.   Skin:  Negative for color change and rash.   Allergic/Immunologic: Negative.    Neurological:  Negative for dizziness, weakness, light-headedness and headaches.   Hematological:  Negative for adenopathy. Does not bruise/bleed easily.   Psychiatric/Behavioral:  Negative for agitation, confusion and dysphoric mood. The patient is not nervous/anxious.         The following portions of the patient's history were reviewed and   updated as appropriate: allergies, current medications, past family history, past medical history, past social history, past surgical history, and problem list.    Compared to one year ago, the patient feels his physical   health is the same.    Compared to one year ago, the patient feels his mental   health is the same.    Recent Hospitalizations:  He was not admitted to the hospital during the last year.       Current Medical Providers:  Patient Care Team:  Ross Nelson MD as PCP - General (Internal Medicine)    Outpatient Medications Prior to Visit   Medication Sig Dispense Refill    tadalafil (Cialis) 20 MG tablet Take 1 tablet by mouth  "Daily As Needed for Erectile Dysfunction. 6 tablet 2    vitamin D3 125 MCG (5000 UT) capsule capsule        No facility-administered medications prior to visit.       No opioid medication identified on active medication list. I have reviewed chart for other potential  high risk medication/s and harmful drug interactions in the elderly.        Aspirin is not on active medication list.  Aspirin use is not indicated based on review of current medical condition/s. Risk of harm outweighs potential benefits.  .    Patient Active Problem List   Diagnosis    Low back pain    Healthcare maintenance    Vitamin D deficiency    Other male erectile dysfunction    History of adenomatous polyp of colon     Advance Care Planning  Advance Directive is not on file.  ACP discussion was held with the patient during this visit. Patient has an advance directive (not in EMR), copy requested.     Objective    Vitals:    10/31/24 1356   BP: 119/75   Pulse: 81   Resp: 16   Temp: 98.4 °F (36.9 °C)   TempSrc: Infrared   SpO2: 94%   Weight: 81.2 kg (179 lb)   Height: 182.9 cm (72.01\")   PainSc:   1   PainLoc: Back     Estimated body mass index is 24.27 kg/m² as calculated from the following:    Height as of this encounter: 182.9 cm (72.01\").    Weight as of this encounter: 81.2 kg (179 lb).    Physical Exam  Constitutional:       General: He is not in acute distress.     Appearance: He is well-developed.   HENT:      Nose: Nose normal.   Eyes:      General: No scleral icterus.     Conjunctiva/sclera: Conjunctivae normal.   Neck:      Thyroid: No thyromegaly.      Trachea: No tracheal deviation.   Cardiovascular:      Rate and Rhythm: Normal rate and regular rhythm.      Heart sounds: No murmur heard.     No friction rub.   Pulmonary:      Effort: No respiratory distress.      Breath sounds: No wheezing or rales.   Abdominal:      General: There is no distension.      Palpations: Abdomen is soft. There is no mass.      Tenderness: There is no " abdominal tenderness. There is no guarding.   Musculoskeletal:         General: Deformity present. Normal range of motion.   Lymphadenopathy:      Cervical: No cervical adenopathy.   Skin:     General: Skin is warm and dry.      Findings: No erythema or rash.   Neurological:      Mental Status: He is alert and oriented to person, place, and time.      Cranial Nerves: No cranial nerve deficit.      Coordination: Coordination normal.      Deep Tendon Reflexes: Reflexes are normal and symmetric.   Psychiatric:         Behavior: Behavior normal.         Thought Content: Thought content normal.         Judgment: Judgment normal.         BMI is within normal parameters. No other follow-up for BMI required.      Does the patient have evidence of cognitive impairment?   No            HEALTH RISK ASSESSMENT    Smoking Status:  Social History     Tobacco Use   Smoking Status Former    Current packs/day: 0.00    Average packs/day: 1 pack/day for 3.0 years (3.0 ttl pk-yrs)    Types: Cigarettes    Start date: 1976    Quit date: 1979    Years since quittin.8   Smokeless Tobacco Never   Tobacco Comments    quit in college     Alcohol Consumption:  Social History     Substance and Sexual Activity   Alcohol Use Yes    Alcohol/week: 4.0 standard drinks of alcohol    Types: 1 Glasses of wine, 2 Cans of beer, 1 Drinks containing 0.5 oz of alcohol per week    Comment: 3-7 per week     Fall Risk Screen:    SALLY Fall Risk Assessment was completed, and patient is at LOW risk for falls.Assessment completed on:10/31/2024    Depression Screening:      10/31/2024     1:55 PM   PHQ-2/PHQ-9 Depression Screening   Little interest or pleasure in doing things Not at all   Feeling down, depressed, or hopeless Not at all       Health Habits and Functional and Cognitive Screening:      10/31/2024     1:55 PM   Functional & Cognitive Status   Do you have difficulty preparing food and eating? No   Do you have difficulty bathing yourself,  getting dressed or grooming yourself? No   Do you have difficulty using the toilet? No   Do you have difficulty moving around from place to place? No   Do you have trouble with steps or getting out of a bed or a chair? No   Current Diet Well Balanced Diet   Dental Exam Up to date   Eye Exam Up to date   Exercise (times per week) 5 times per week   Current Exercises Include Other   Do you need help using the phone?  No   Are you deaf or do you have serious difficulty hearing?  No   Do you need help to go to places out of walking distance? No   Do you need help shopping? No   Do you need help preparing meals?  No   Do you need help with housework?  No   Do you need help with laundry? No   Do you need help taking your medications? No   Do you need help managing money? No   Do you ever drive or ride in a car without wearing a seat belt? No   Have you felt unusual stress, anger or loneliness in the last month? No   Who do you live with? Spouse   If you need help, do you have trouble finding someone available to you? No   Have you been bothered in the last four weeks by sexual problems? No   Do you have difficulty concentrating, remembering or making decisions? No       Age-appropriate Screening Schedule:  Refer to the list below for future screening recommendations based on patient's age, sex and/or medical conditions. Orders for these recommended tests are listed in the plan section. The patient has been provided with a written plan.    Health Maintenance   Topic Date Due    ANNUAL WELLNESS VISIT  Never done    AAA SCREEN (ONE-TIME)  Never done    ZOSTER VACCINE (1 of 2) 12/21/2024 (Originally 1/21/2009)    COVID-19 Vaccine (4 - 2023-24 season) 10/31/2025 (Originally 9/1/2024)    Pneumococcal Vaccine 65+ (1 of 1 - PCV) 10/31/2025 (Originally 1/21/2024)    COLORECTAL CANCER SCREENING  01/10/2027    TDAP/TD VACCINES (2 - Td or Tdap) 12/07/2030    HEPATITIS C SCREENING  Completed    INFLUENZA VACCINE  Completed                 CMS Preventative Services Quick Reference  Risk Factors Identified During Encounter:    Alcohol Misuse: Patient encouraged to limit alcohol use to no more than 1 standard alcoholic beverage per day. (12 ounce beer, 6 ounce wine, one shot liquor)    The above risks/problems have been discussed with the patient.  Pertinent information has been shared with the patient in the After Visit Summary.    Diagnoses and all orders for this visit:    1. Needs flu shot (Primary)  -     Fluzone High-Dose 65+yrs ([id number])    2. Erectile dysfunction due to arterial insufficiency continue Cialis 20 mg as needed. does not use tobacco.        Follow Up:   Next Medicare Wellness visit to be scheduled in 1 year.      An After Visit Summary and PPPS were made available to the patient.

## 2025-03-14 ENCOUNTER — TELEMEDICINE (OUTPATIENT)
Dept: FAMILY MEDICINE CLINIC | Facility: TELEHEALTH | Age: 65
End: 2025-03-14
Payer: MEDICARE

## 2025-03-14 DIAGNOSIS — S30.861D TICK BITE OF ABDOMINAL WALL, SUBSEQUENT ENCOUNTER: Primary | ICD-10-CM
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
## 2025-03-14 RX ORDER — DOXYCYCLINE 100 MG/1
100 CAPSULE ORAL 2 TIMES DAILY
Qty: 20 CAPSULE | Refills: 0 | Status: SHIPPED | OUTPATIENT
Start: 2025-03-14 | End: 2025-03-24

## 2025-03-14 NOTE — PROGRESS NOTES
You have chosen to receive care through a telehealth visit.  Do you consent to use a video/audio connection for your medical care today? Yes     HPI  History of Present Illness  The patient presents via virtual visit for evaluation of a tick bite.    Approximately 2 weeks ago, he experienced a tick bite while in his yard. Initially, he adopted a wait-and-see approach as there were no significant manifestations. However, this morning, he observed an increase in the size and redness of the affected area, prompting him to seek medical attention. He reports that the tick was not engorged or embedded in his skin. The surrounding area is approximately the size of a small lemon. He experienced itching during the first week, which has since subsided. The redness is attributed to scratching. Currently, he reports no tenderness or itching. He also notes the presence of a ring-like pattern around the bite site. He reports no current symptoms such as headache or body aches. He has been applying tea tree oil to the affected area. He has a history of a previous tick bite, which was more indicative of a bull's-eye pattern. He suspects the current tick to be a brown deer tick, unlike the previous black-legged tick. The current bite occurred in [facility].    Supplemental Information  He developed an allergic reaction to ground beef about a month after the tick bite, breaking out in hives all over his forehead. Benadryl relieves the symptoms. He can eat regular beef without any problem but can not eat steak or ground beef.    SOCIAL HISTORY  He does not smoke. He prefers to have one drink a day.    ALLERGIES  The patient has no known allergies.    MEDICATIONS  Current: Vitamin D, Cialis    Review of Systems: see HPI    Past Medical History:   Diagnosis Date    Colon polyp     Visual impairment 1-1-2019       Family History   Problem Relation Age of Onset    Heart disease Mother     Cancer Father         multi mylolmona     Kidney disease Father         5yrs dialysis    No Known Problems Sister     No Known Problems Brother        Social History     Socioeconomic History    Marital status:    Tobacco Use    Smoking status: Former     Current packs/day: 0.00     Average packs/day: 1 pack/day for 3.0 years (3.0 ttl pk-yrs)     Types: Cigarettes     Start date: 1976     Quit date: 1979     Years since quittin.2    Smokeless tobacco: Never    Tobacco comments:     quit in college   Vaping Use    Vaping status: Never Used   Substance and Sexual Activity    Alcohol use: Yes     Alcohol/week: 4.0 standard drinks of alcohol     Types: 1 Glasses of wine, 2 Cans of beer, 1 Drinks containing 0.5 oz of alcohol per week     Comment: 3-7 per week    Drug use: No    Sexual activity: Yes     Partners: Female         There were no vitals taken for this visit.    PHYSICAL EXAM  Physical Exam   Constitutional: He appears well-developed and well-nourished. He does not have a sickly appearance. He does not appear ill. No distress.   HENT:   Head: Normocephalic.   Pulmonary/Chest: Effort normal.   Skin: Rash noted. There is erythema.   Macular rash with small area of brownness in the center. Patient reports this is the tick bite site. No Bulls eye appearance. The area in circular with a lacy border the size of a small lemon. No drainage or swelling noted. Pt. Reports no tenderness.    Psychiatric: He has a normal mood and affect.   Vitals reviewed.    Physical Exam      Diagnoses and all orders for this visit:    1. Tick bite of abdominal wall, subsequent encounter (Primary)  -     doxycycline (VIBRAMYCIN) 100 MG capsule; Take 1 capsule by mouth 2 (Two) Times a Day for 10 days.  Dispense: 20 capsule; Refill: 0      Assessment & Plan  1. Tick bite.  The patient reported a tick bite that occurred about 2 weeks ago, which has recently become redder and slightly larger. He removed the tick himself and noted a small spot at the center with  surrounding redness about the size of a small lemon. There is no tenderness or current itching, although it did itch during the first week. He has no symptoms such as headache or body aches. The patient is not allergic to any antibiotics. Prophylactic treatment for Lyme disease and [place] spotted fever will be initiated with doxycycline 100 mg, to be taken twice daily for 10 days. The patient was advised to take the medication with food to minimize gastrointestinal upset. Common side effects, including nausea, vomiting, diarrhea, and photosensitivity, were discussed. He was informed that there is no contraindication to alcohol consumption with doxycycline, but he should verify this with his pharmacist. For itching, he may apply a steroid cream or take Benadryl at night. Keep the area clean and dry. Wash daily with soap and water. He was advised to monitor the redness by marking the border with a pen and to seek medical attention if the redness expands, or he develops symptoms such as headache or body aches. He was also instructed to note the date of the tick bite and to seek medical attention if symptoms develop 3 to 4 weeks later. Information on tick bites and Lyme disease will be sent via [employer].      FOLLOW-UP  As discussed during visit with [facility], if symptoms worsen or fail to improve, follow-up with PCP/Urgent Care/Emergency Department.    Patient verbalizes understanding of medications, instructions for treatment and follow-up.    Patient or patient representative verbalized consent for the use of Ambient Listening during the visit with  EMI Reed for chart documentation. 3/14/2025  11:18 EDT    EMI Reed  03/14/2025  11:18 EDT    The use of a video visit has been reviewed with the patient and verbal informed consent has been obtained. Myself and El Greenwood participated in this visit. The patient is located in [facility], and I am located in [place], [place].  [employer]  were utilized.    0

## (undated) DEVICE — ENDOSCOPY PORT CONNECTOR FOR OLYMPUS® SCOPES: Brand: ERBE

## (undated) DEVICE — PAD GRND REM POLYHESIVE A/ DISP

## (undated) DEVICE — SNAR POLYP SENSATION STDOVL 27 240 BX40

## (undated) DEVICE — LUBE JELLY PK/2.75GM STRL BX/144

## (undated) DEVICE — SINGLE-USE POLYPECTOMY SNARE: Brand: CAPTIVATOR II

## (undated) DEVICE — VLV SXN AIR/H2O ORCAPOD3 1P/U STRL

## (undated) DEVICE — FRCP BIOP COLD ENDOJAW ALLGTR W/NDL 2.8X2300MM BLU

## (undated) DEVICE — Device

## (undated) DEVICE — QUICK CATCH IN-LINE SUCTION POLYP TRAP IS USED FOR SUCTION RETRIEVAL OF ENDOSCOPICALLY REMOVED POLYPS.

## (undated) DEVICE — HYBRID TUBING/CAP SET FOR OLYMPUS® SCOPES: Brand: ERBE

## (undated) DEVICE — FRCP BX RADJAW4 NDL 2.8 240 STD OG

## (undated) DEVICE — Device: Brand: DEFENDO AIR/WATER/SUCTION AND BIOPSY VALVE